# Patient Record
Sex: MALE | Race: WHITE | Employment: OTHER | URBAN - METROPOLITAN AREA
[De-identification: names, ages, dates, MRNs, and addresses within clinical notes are randomized per-mention and may not be internally consistent; named-entity substitution may affect disease eponyms.]

---

## 2022-03-30 ENCOUNTER — HOSPITAL ENCOUNTER (INPATIENT)
Age: 42
LOS: 2 days | Discharge: HOME | DRG: 638 | End: 2022-04-01
Attending: EMERGENCY MEDICINE | Admitting: INTERNAL MEDICINE

## 2022-03-30 ENCOUNTER — APPOINTMENT (OUTPATIENT)
Dept: GENERAL RADIOLOGY | Age: 42
DRG: 638 | End: 2022-03-30

## 2022-03-30 DIAGNOSIS — E13.10 DIABETIC KETOACIDOSIS WITHOUT COMA ASSOCIATED WITH OTHER SPECIFIED DIABETES MELLITUS (HCC): Primary | ICD-10-CM

## 2022-03-30 PROBLEM — E08.10 DIABETIC KETOACIDOSIS WITHOUT COMA ASSOCIATED WITH DIABETES MELLITUS DUE TO UNDERLYING CONDITION (HCC): Status: ACTIVE | Noted: 2022-03-30

## 2022-03-30 PROBLEM — E11.10 DIABETIC ACIDOSIS WITHOUT COMA (HCC): Status: ACTIVE | Noted: 2022-03-30

## 2022-03-30 PROBLEM — E11.10 DKA, TYPE 2, NOT AT GOAL (HCC): Status: ACTIVE | Noted: 2022-03-30

## 2022-03-30 LAB
A/G RATIO: 1.6 (ref 1.1–2.2)
ALBUMIN SERPL-MCNC: 4.6 G/DL (ref 3.4–5)
ALP BLD-CCNC: 197 U/L (ref 40–129)
ALT SERPL-CCNC: 16 U/L (ref 10–40)
AMORPHOUS: ABNORMAL /HPF
AMPHETAMINE SCREEN, URINE: NORMAL
ANION GAP SERPL CALCULATED.3IONS-SCNC: 13 MMOL/L (ref 3–16)
ANION GAP SERPL CALCULATED.3IONS-SCNC: 16 MMOL/L (ref 3–16)
ANION GAP SERPL CALCULATED.3IONS-SCNC: 29 MMOL/L (ref 3–16)
AST SERPL-CCNC: 10 U/L (ref 15–37)
BACTERIA: ABNORMAL /HPF
BANDED NEUTROPHILS RELATIVE PERCENT: 12 % (ref 0–7)
BARBITURATE SCREEN URINE: NORMAL
BASE EXCESS VENOUS: -22 (ref -3–3)
BASE EXCESS VENOUS: <-30 MMOL/L (ref -3–3)
BASE EXCESS VENOUS: <30 MMOL/L (ref -3–3)
BASOPHILS ABSOLUTE: 0 K/UL (ref 0–0.2)
BASOPHILS ABSOLUTE: 0 K/UL (ref 0–0.2)
BASOPHILS RELATIVE PERCENT: 0 %
BASOPHILS RELATIVE PERCENT: 0.2 %
BENZODIAZEPINE SCREEN, URINE: NORMAL
BILIRUB SERPL-MCNC: <0.2 MG/DL (ref 0–1)
BILIRUBIN URINE: ABNORMAL
BILIRUBIN URINE: NEGATIVE
BLOOD, URINE: ABNORMAL
BLOOD, URINE: ABNORMAL
BUN BLDV-MCNC: 12 MG/DL (ref 7–20)
BUN BLDV-MCNC: 14 MG/DL (ref 7–20)
BUN BLDV-MCNC: 16 MG/DL (ref 7–20)
CALCIUM SERPL-MCNC: 8 MG/DL (ref 8.3–10.6)
CALCIUM SERPL-MCNC: 8.8 MG/DL (ref 8.3–10.6)
CALCIUM SERPL-MCNC: 9.1 MG/DL (ref 8.3–10.6)
CANNABINOID SCREEN URINE: NORMAL
CELLULAR CASTS: ABNORMAL /LPF
CHLORIDE BLD-SCNC: 103 MMOL/L (ref 99–110)
CHLORIDE BLD-SCNC: 111 MMOL/L (ref 99–110)
CHLORIDE BLD-SCNC: 114 MMOL/L (ref 99–110)
CLARITY: CLEAR
CLARITY: CLEAR
CO2: 10 MMOL/L (ref 21–32)
CO2: 3 MMOL/L (ref 21–32)
CO2: 9 MMOL/L (ref 21–32)
COCAINE METABOLITE SCREEN URINE: NORMAL
COLOR: YELLOW
COLOR: YELLOW
CREAT SERPL-MCNC: 0.8 MG/DL (ref 0.9–1.3)
CREAT SERPL-MCNC: 0.9 MG/DL (ref 0.9–1.3)
CREAT SERPL-MCNC: 1 MG/DL (ref 0.9–1.3)
EKG ATRIAL RATE: 116 BPM
EKG ATRIAL RATE: 81 BPM
EKG DIAGNOSIS: NORMAL
EKG DIAGNOSIS: NORMAL
EKG P AXIS: 52 DEGREES
EKG P AXIS: 63 DEGREES
EKG P-R INTERVAL: 130 MS
EKG P-R INTERVAL: 134 MS
EKG Q-T INTERVAL: 306 MS
EKG Q-T INTERVAL: 410 MS
EKG QRS DURATION: 88 MS
EKG QRS DURATION: 94 MS
EKG QTC CALCULATION (BAZETT): 425 MS
EKG QTC CALCULATION (BAZETT): 476 MS
EKG R AXIS: 58 DEGREES
EKG R AXIS: 74 DEGREES
EKG T AXIS: -27 DEGREES
EKG T AXIS: 67 DEGREES
EKG VENTRICULAR RATE: 116 BPM
EKG VENTRICULAR RATE: 81 BPM
EOSINOPHILS ABSOLUTE: 0 K/UL (ref 0–0.6)
EOSINOPHILS ABSOLUTE: 0 K/UL (ref 0–0.6)
EOSINOPHILS RELATIVE PERCENT: 0 %
EOSINOPHILS RELATIVE PERCENT: 0 %
EPITHELIAL CELLS, UA: ABNORMAL /HPF (ref 0–5)
ETHANOL: NORMAL MG/DL (ref 0–0.08)
FINE CASTS, UA: ABNORMAL /LPF (ref 0–2)
GFR AFRICAN AMERICAN: >60
GFR NON-AFRICAN AMERICAN: >60
GLUCOSE BLD-MCNC: 169 MG/DL (ref 70–99)
GLUCOSE BLD-MCNC: 175 MG/DL (ref 70–99)
GLUCOSE BLD-MCNC: 178 MG/DL (ref 70–99)
GLUCOSE BLD-MCNC: 179 MG/DL (ref 70–99)
GLUCOSE BLD-MCNC: 179 MG/DL (ref 70–99)
GLUCOSE BLD-MCNC: 185 MG/DL (ref 70–99)
GLUCOSE BLD-MCNC: 185 MG/DL (ref 70–99)
GLUCOSE BLD-MCNC: 187 MG/DL (ref 70–99)
GLUCOSE BLD-MCNC: 195 MG/DL (ref 70–99)
GLUCOSE BLD-MCNC: 195 MG/DL (ref 70–99)
GLUCOSE BLD-MCNC: 203 MG/DL (ref 70–99)
GLUCOSE BLD-MCNC: 217 MG/DL (ref 70–99)
GLUCOSE BLD-MCNC: 246 MG/DL (ref 70–99)
GLUCOSE BLD-MCNC: 263 MG/DL (ref 70–99)
GLUCOSE BLD-MCNC: 331 MG/DL (ref 70–99)
GLUCOSE BLD-MCNC: 451 MG/DL (ref 70–99)
GLUCOSE BLD-MCNC: 481 MG/DL (ref 70–99)
GLUCOSE URINE: 250 MG/DL
GLUCOSE URINE: 500 MG/DL
HCO3 VENOUS: 2.6 MMOL/L (ref 23–29)
HCO3 VENOUS: 3.4 MMOL/L (ref 23–29)
HCO3 VENOUS: 7.6 MMOL/L (ref 23–29)
HCT VFR BLD CALC: 39.2 % (ref 40.5–52.5)
HCT VFR BLD CALC: 43.8 % (ref 40.5–52.5)
HEMOGLOBIN: 13.4 G/DL (ref 13.5–17.5)
HEMOGLOBIN: 14.2 G/DL (ref 13.5–17.5)
HYALINE CASTS: ABNORMAL /LPF (ref 0–2)
KETONES, URINE: 40 MG/DL
KETONES, URINE: >=160 MG/DL
LACTATE: 1.52 MMOL/L (ref 0.4–2)
LACTIC ACID: 1.2 MMOL/L (ref 0.4–2)
LEUKOCYTE ESTERASE, URINE: NEGATIVE
LEUKOCYTE ESTERASE, URINE: NEGATIVE
LIPASE: 98 U/L (ref 13–60)
LYMPHOCYTES ABSOLUTE: 0.3 K/UL (ref 1–5.1)
LYMPHOCYTES ABSOLUTE: 0.7 K/UL (ref 1–5.1)
LYMPHOCYTES RELATIVE PERCENT: 2 %
LYMPHOCYTES RELATIVE PERCENT: 6.6 %
Lab: NORMAL
MAGNESIUM: 1.6 MG/DL (ref 1.8–2.4)
MAGNESIUM: 1.8 MG/DL (ref 1.8–2.4)
MAGNESIUM: 2.2 MG/DL (ref 1.8–2.4)
MCH RBC QN AUTO: 32.5 PG (ref 26–34)
MCH RBC QN AUTO: 32.7 PG (ref 26–34)
MCHC RBC AUTO-ENTMCNC: 32.5 G/DL (ref 31–36)
MCHC RBC AUTO-ENTMCNC: 34.1 G/DL (ref 31–36)
MCV RBC AUTO: 100 FL (ref 80–100)
MCV RBC AUTO: 95.8 FL (ref 80–100)
METAMYELOCYTES RELATIVE PERCENT: 2 %
METHADONE SCREEN, URINE: NORMAL
MICROSCOPIC EXAMINATION: YES
MICROSCOPIC EXAMINATION: YES
MONOCYTES ABSOLUTE: 1 K/UL (ref 0–1.3)
MONOCYTES ABSOLUTE: 1.2 K/UL (ref 0–1.3)
MONOCYTES RELATIVE PERCENT: 10.9 %
MONOCYTES RELATIVE PERCENT: 6 %
NEUTROPHILS ABSOLUTE: 15.9 K/UL (ref 1.7–7.7)
NEUTROPHILS ABSOLUTE: 9.2 K/UL (ref 1.7–7.7)
NEUTROPHILS RELATIVE PERCENT: 78 %
NEUTROPHILS RELATIVE PERCENT: 82.3 %
NITRITE, URINE: NEGATIVE
NITRITE, URINE: NEGATIVE
O2 SAT, VEN: 17 %
O2 SAT, VEN: 66 %
O2 SAT, VEN: 70 %
O2 THERAPY: ABNORMAL
O2 THERAPY: ABNORMAL
OPIATE SCREEN URINE: NORMAL
OXYCODONE URINE: NORMAL
PCO2, VEN: 15.2 MMHG (ref 40–50)
PCO2, VEN: 18.5 MMHG (ref 40–50)
PCO2, VEN: 24.7 MM HG (ref 40–50)
PDW BLD-RTO: 15.2 % (ref 12.4–15.4)
PDW BLD-RTO: 15.8 % (ref 12.4–15.4)
PERFORMED ON: ABNORMAL
PH UA: 5.5 (ref 5–8)
PH UA: 6
PH UA: 6 (ref 5–8)
PH VENOUS: 6.84 (ref 7.35–7.45)
PH VENOUS: 6.87 (ref 7.35–7.45)
PH VENOUS: 7.1 (ref 7.35–7.45)
PH VENOUS: 7.18 (ref 7.35–7.45)
PH VENOUS: 7.25 (ref 7.35–7.45)
PHENCYCLIDINE SCREEN URINE: NORMAL
PHOSPHORUS: 1.2 MG/DL (ref 2.5–4.9)
PHOSPHORUS: 1.6 MG/DL (ref 2.5–4.9)
PHOSPHORUS: 3.2 MG/DL (ref 2.5–4.9)
PLATELET # BLD: 204 K/UL (ref 135–450)
PLATELET # BLD: 323 K/UL (ref 135–450)
PLATELET SLIDE REVIEW: ADEQUATE
PMV BLD AUTO: 6.8 FL (ref 5–10.5)
PMV BLD AUTO: 7.5 FL (ref 5–10.5)
PO2, VEN: 19 MM HG
PO2, VEN: 57.7 MMHG (ref 25–40)
PO2, VEN: 63.7 MMHG (ref 25–40)
POC SAMPLE TYPE: ABNORMAL
POTASSIUM SERPL-SCNC: 2.7 MMOL/L (ref 3.5–5.1)
POTASSIUM SERPL-SCNC: 2.8 MMOL/L (ref 3.5–5.1)
POTASSIUM SERPL-SCNC: 3.5 MMOL/L (ref 3.5–5.1)
PROCALCITONIN: 0.56 NG/ML (ref 0–0.15)
PROPOXYPHENE SCREEN: NORMAL
PROTEIN UA: 100 MG/DL
PROTEIN UA: 30 MG/DL
RBC # BLD: 4.09 M/UL (ref 4.2–5.9)
RBC # BLD: 4.38 M/UL (ref 4.2–5.9)
RBC UA: ABNORMAL /HPF (ref 0–4)
RBC UA: ABNORMAL /HPF (ref 0–4)
SLIDE REVIEW: ABNORMAL
SODIUM BLD-SCNC: 135 MMOL/L (ref 136–145)
SODIUM BLD-SCNC: 136 MMOL/L (ref 136–145)
SODIUM BLD-SCNC: 137 MMOL/L (ref 136–145)
SPECIFIC GRAVITY UA: 1.02 (ref 1–1.03)
SPECIFIC GRAVITY UA: 1.02 (ref 1–1.03)
TCO2 CALC VENOUS: 8 MMOL/L
TCO2 CALC VENOUS: <5 MMOL/L
TCO2 CALC VENOUS: <5 MMOL/L
TEAR DROP CELLS: ABNORMAL
TOTAL CK: 115 U/L (ref 39–308)
TOTAL PROTEIN: 7.5 G/DL (ref 6.4–8.2)
TROPONIN: <0.01 NG/ML
TROPONIN: <0.01 NG/ML
URINE REFLEX TO CULTURE: ABNORMAL
URINE TYPE: ABNORMAL
URINE TYPE: ABNORMAL
UROBILINOGEN, URINE: 0.2 E.U./DL
UROBILINOGEN, URINE: 0.2 E.U./DL
WBC # BLD: 11.2 K/UL (ref 4–11)
WBC # BLD: 17.3 K/UL (ref 4–11)
WBC UA: ABNORMAL /HPF (ref 0–5)
WBC UA: ABNORMAL /HPF (ref 0–5)

## 2022-03-30 PROCEDURE — 96376 TX/PRO/DX INJ SAME DRUG ADON: CPT

## 2022-03-30 PROCEDURE — 6360000002 HC RX W HCPCS: Performed by: STUDENT IN AN ORGANIZED HEALTH CARE EDUCATION/TRAINING PROGRAM

## 2022-03-30 PROCEDURE — 96365 THER/PROPH/DIAG IV INF INIT: CPT

## 2022-03-30 PROCEDURE — 82803 BLOOD GASES ANY COMBINATION: CPT

## 2022-03-30 PROCEDURE — 84484 ASSAY OF TROPONIN QUANT: CPT

## 2022-03-30 PROCEDURE — 6370000000 HC RX 637 (ALT 250 FOR IP): Performed by: STUDENT IN AN ORGANIZED HEALTH CARE EDUCATION/TRAINING PROGRAM

## 2022-03-30 PROCEDURE — 2580000003 HC RX 258: Performed by: STUDENT IN AN ORGANIZED HEALTH CARE EDUCATION/TRAINING PROGRAM

## 2022-03-30 PROCEDURE — 85025 COMPLETE CBC W/AUTO DIFF WBC: CPT

## 2022-03-30 PROCEDURE — 82077 ASSAY SPEC XCP UR&BREATH IA: CPT

## 2022-03-30 PROCEDURE — 81001 URINALYSIS AUTO W/SCOPE: CPT

## 2022-03-30 PROCEDURE — 99223 1ST HOSP IP/OBS HIGH 75: CPT | Performed by: INTERNAL MEDICINE

## 2022-03-30 PROCEDURE — 93010 ELECTROCARDIOGRAM REPORT: CPT | Performed by: INTERNAL MEDICINE

## 2022-03-30 PROCEDURE — 2580000003 HC RX 258: Performed by: EMERGENCY MEDICINE

## 2022-03-30 PROCEDURE — 2500000003 HC RX 250 WO HCPCS: Performed by: STUDENT IN AN ORGANIZED HEALTH CARE EDUCATION/TRAINING PROGRAM

## 2022-03-30 PROCEDURE — 74018 RADEX ABDOMEN 1 VIEW: CPT

## 2022-03-30 PROCEDURE — 83036 HEMOGLOBIN GLYCOSYLATED A1C: CPT

## 2022-03-30 PROCEDURE — 82947 ASSAY GLUCOSE BLOOD QUANT: CPT

## 2022-03-30 PROCEDURE — 83735 ASSAY OF MAGNESIUM: CPT

## 2022-03-30 PROCEDURE — 84100 ASSAY OF PHOSPHORUS: CPT

## 2022-03-30 PROCEDURE — 83605 ASSAY OF LACTIC ACID: CPT

## 2022-03-30 PROCEDURE — 80307 DRUG TEST PRSMV CHEM ANLYZR: CPT

## 2022-03-30 PROCEDURE — 93005 ELECTROCARDIOGRAM TRACING: CPT | Performed by: EMERGENCY MEDICINE

## 2022-03-30 PROCEDURE — 83690 ASSAY OF LIPASE: CPT

## 2022-03-30 PROCEDURE — 84145 PROCALCITONIN (PCT): CPT

## 2022-03-30 PROCEDURE — 80053 COMPREHEN METABOLIC PANEL: CPT

## 2022-03-30 PROCEDURE — 6370000000 HC RX 637 (ALT 250 FOR IP): Performed by: EMERGENCY MEDICINE

## 2022-03-30 PROCEDURE — 87040 BLOOD CULTURE FOR BACTERIA: CPT

## 2022-03-30 PROCEDURE — 82550 ASSAY OF CK (CPK): CPT

## 2022-03-30 PROCEDURE — 82800 BLOOD PH: CPT

## 2022-03-30 PROCEDURE — 99284 EMERGENCY DEPT VISIT MOD MDM: CPT

## 2022-03-30 PROCEDURE — 6360000002 HC RX W HCPCS: Performed by: EMERGENCY MEDICINE

## 2022-03-30 PROCEDURE — 2000000000 HC ICU R&B

## 2022-03-30 PROCEDURE — 36415 COLL VENOUS BLD VENIPUNCTURE: CPT

## 2022-03-30 PROCEDURE — 96375 TX/PRO/DX INJ NEW DRUG ADDON: CPT

## 2022-03-30 PROCEDURE — 71045 X-RAY EXAM CHEST 1 VIEW: CPT

## 2022-03-30 PROCEDURE — 93005 ELECTROCARDIOGRAM TRACING: CPT | Performed by: STUDENT IN AN ORGANIZED HEALTH CARE EDUCATION/TRAINING PROGRAM

## 2022-03-30 RX ORDER — 0.9 % SODIUM CHLORIDE 0.9 %
1000 INTRAVENOUS SOLUTION INTRAVENOUS ONCE
Status: COMPLETED | OUTPATIENT
Start: 2022-03-30 | End: 2022-03-30

## 2022-03-30 RX ORDER — DEXTROSE MONOHYDRATE 25 G/50ML
12.5 INJECTION, SOLUTION INTRAVENOUS PRN
Status: DISCONTINUED | OUTPATIENT
Start: 2022-03-30 | End: 2022-03-30

## 2022-03-30 RX ORDER — POTASSIUM CHLORIDE 7.45 MG/ML
10 INJECTION INTRAVENOUS PRN
Status: DISCONTINUED | OUTPATIENT
Start: 2022-03-30 | End: 2022-03-31

## 2022-03-30 RX ORDER — DEXTROSE MONOHYDRATE 50 MG/ML
100 INJECTION, SOLUTION INTRAVENOUS PRN
Status: DISCONTINUED | OUTPATIENT
Start: 2022-03-30 | End: 2022-03-30

## 2022-03-30 RX ORDER — SODIUM CHLORIDE 450 MG/100ML
INJECTION, SOLUTION INTRAVENOUS CONTINUOUS
Status: DISCONTINUED | OUTPATIENT
Start: 2022-03-30 | End: 2022-03-31

## 2022-03-30 RX ORDER — POTASSIUM CHLORIDE 20 MEQ/1
40 TABLET, EXTENDED RELEASE ORAL ONCE
Status: COMPLETED | OUTPATIENT
Start: 2022-03-30 | End: 2022-03-30

## 2022-03-30 RX ORDER — NICOTINE POLACRILEX 4 MG
15 LOZENGE BUCCAL PRN
Status: DISCONTINUED | OUTPATIENT
Start: 2022-03-30 | End: 2022-03-30

## 2022-03-30 RX ORDER — POLYETHYLENE GLYCOL 3350 17 G/17G
17 POWDER, FOR SOLUTION ORAL DAILY PRN
Status: DISCONTINUED | OUTPATIENT
Start: 2022-03-30 | End: 2022-04-01 | Stop reason: HOSPADM

## 2022-03-30 RX ORDER — DEXTROSE AND SODIUM CHLORIDE 5; .45 G/100ML; G/100ML
INJECTION, SOLUTION INTRAVENOUS CONTINUOUS PRN
Status: DISCONTINUED | OUTPATIENT
Start: 2022-03-30 | End: 2022-03-31

## 2022-03-30 RX ORDER — MAGNESIUM SULFATE 1 G/100ML
1000 INJECTION INTRAVENOUS PRN
Status: DISCONTINUED | OUTPATIENT
Start: 2022-03-30 | End: 2022-03-31

## 2022-03-30 RX ORDER — SODIUM CHLORIDE 0.9 % (FLUSH) 0.9 %
5-40 SYRINGE (ML) INJECTION EVERY 12 HOURS SCHEDULED
Status: DISCONTINUED | OUTPATIENT
Start: 2022-03-30 | End: 2022-04-01 | Stop reason: HOSPADM

## 2022-03-30 RX ORDER — ONDANSETRON 2 MG/ML
4 INJECTION INTRAMUSCULAR; INTRAVENOUS ONCE
Status: COMPLETED | OUTPATIENT
Start: 2022-03-30 | End: 2022-03-30

## 2022-03-30 RX ORDER — SODIUM CHLORIDE 9 MG/ML
INJECTION, SOLUTION INTRAVENOUS PRN
Status: DISCONTINUED | OUTPATIENT
Start: 2022-03-30 | End: 2022-04-01 | Stop reason: HOSPADM

## 2022-03-30 RX ORDER — SODIUM CHLORIDE 9 MG/ML
1000 INJECTION, SOLUTION INTRAVENOUS CONTINUOUS
Status: DISCONTINUED | OUTPATIENT
Start: 2022-03-30 | End: 2022-03-31

## 2022-03-30 RX ORDER — POTASSIUM CHLORIDE 7.45 MG/ML
10 INJECTION INTRAVENOUS
Status: ACTIVE | OUTPATIENT
Start: 2022-03-30 | End: 2022-03-31

## 2022-03-30 RX ORDER — SODIUM CHLORIDE 0.9 % (FLUSH) 0.9 %
5-40 SYRINGE (ML) INJECTION PRN
Status: DISCONTINUED | OUTPATIENT
Start: 2022-03-30 | End: 2022-04-01 | Stop reason: HOSPADM

## 2022-03-30 RX ORDER — POTASSIUM CHLORIDE 750 MG/1
40 TABLET, EXTENDED RELEASE ORAL ONCE
Status: COMPLETED | OUTPATIENT
Start: 2022-03-30 | End: 2022-03-30

## 2022-03-30 RX ADMIN — SODIUM PHOSPHATE, MONOBASIC, MONOHYDRATE AND SODIUM PHOSPHATE, DIBASIC, ANHYDROUS 20 MMOL: 276; 142 INJECTION, SOLUTION INTRAVENOUS at 17:40

## 2022-03-30 RX ADMIN — POTASSIUM CHLORIDE 10 MEQ: 10 INJECTION, SOLUTION INTRAVENOUS at 22:39

## 2022-03-30 RX ADMIN — POTASSIUM CHLORIDE 10 MEQ: 10 INJECTION, SOLUTION INTRAVENOUS at 17:31

## 2022-03-30 RX ADMIN — DEXTROSE AND SODIUM CHLORIDE: 5; 450 INJECTION, SOLUTION INTRAVENOUS at 21:35

## 2022-03-30 RX ADMIN — SODIUM CHLORIDE 1000 ML: 9 INJECTION, SOLUTION INTRAVENOUS at 10:28

## 2022-03-30 RX ADMIN — POTASSIUM CHLORIDE 40 MEQ: 20 TABLET, EXTENDED RELEASE ORAL at 21:15

## 2022-03-30 RX ADMIN — POTASSIUM CHLORIDE 10 MEQ: 10 INJECTION, SOLUTION INTRAVENOUS at 23:45

## 2022-03-30 RX ADMIN — POTASSIUM CHLORIDE 10 MEQ: 10 INJECTION, SOLUTION INTRAVENOUS at 16:25

## 2022-03-30 RX ADMIN — POTASSIUM CHLORIDE 10 MEQ: 10 INJECTION, SOLUTION INTRAVENOUS at 21:36

## 2022-03-30 RX ADMIN — SODIUM CHLORIDE 0.1 UNITS/KG/HR: 9 INJECTION, SOLUTION INTRAVENOUS at 11:23

## 2022-03-30 RX ADMIN — SODIUM CHLORIDE, PRESERVATIVE FREE 10 ML: 5 INJECTION INTRAVENOUS at 20:37

## 2022-03-30 RX ADMIN — POTASSIUM CHLORIDE 10 MEQ: 10 INJECTION, SOLUTION INTRAVENOUS at 19:42

## 2022-03-30 RX ADMIN — ONDANSETRON 4 MG: 2 INJECTION INTRAMUSCULAR; INTRAVENOUS at 09:10

## 2022-03-30 RX ADMIN — INSULIN HUMAN 10 UNITS: 100 INJECTION, SOLUTION PARENTERAL at 09:55

## 2022-03-30 RX ADMIN — SODIUM CHLORIDE 1000 ML: 9 INJECTION, SOLUTION INTRAVENOUS at 09:10

## 2022-03-30 RX ADMIN — SODIUM CHLORIDE 3.45 UNITS/HR: 9 INJECTION, SOLUTION INTRAVENOUS at 22:12

## 2022-03-30 RX ADMIN — DEXTROSE AND SODIUM CHLORIDE: 5; 450 INJECTION, SOLUTION INTRAVENOUS at 13:32

## 2022-03-30 RX ADMIN — SODIUM CHLORIDE 1000 ML: 9 INJECTION, SOLUTION INTRAVENOUS at 09:43

## 2022-03-30 RX ADMIN — POTASSIUM CHLORIDE 40 MEQ: 750 TABLET, EXTENDED RELEASE ORAL at 09:58

## 2022-03-30 RX ADMIN — ENOXAPARIN SODIUM 40 MG: 100 INJECTION SUBCUTANEOUS at 14:34

## 2022-03-30 RX ADMIN — POTASSIUM CHLORIDE 10 MEQ: 10 INJECTION, SOLUTION INTRAVENOUS at 18:40

## 2022-03-30 RX ADMIN — THIAMINE HYDROCHLORIDE 100 MG: 100 INJECTION, SOLUTION INTRAMUSCULAR; INTRAVENOUS at 17:43

## 2022-03-30 ASSESSMENT — PAIN DESCRIPTION - LOCATION: LOCATION: CHEST

## 2022-03-30 ASSESSMENT — ENCOUNTER SYMPTOMS
SHORTNESS OF BREATH: 1
SINUS PAIN: 0
DIARRHEA: 0
VOMITING: 1
BACK PAIN: 0
WHEEZING: 0
ABDOMINAL DISTENTION: 0
ABDOMINAL PAIN: 0
CONSTIPATION: 1
SORE THROAT: 0
NAUSEA: 1
COUGH: 1

## 2022-03-30 ASSESSMENT — PAIN SCALES - GENERAL
PAINLEVEL_OUTOF10: 0
PAINLEVEL_OUTOF10: 0

## 2022-03-30 NOTE — PROGRESS NOTES
4 Eyes Admission Assessment     I agree as the admission nurse that 2 RN's have performed a thorough Head to Toe Skin Assessment on the patient. ALL assessment sites listed below have been assessed on admission. Areas assessed by both nurses:  [x]   Head, Face, and Ears   [x]   Shoulders, Back, and Chest  [x]   Arms, Elbows, and Hands   [x]   Coccyx, Sacrum, and Ischium  [x]   Legs, Feet, and Heels        Does the Patient have Skin Breakdown? No . Patient does have scattered rash on bilateral lower extremities. Areas are not open. Patient states that he believes it is related to his high blood sugar.         Valentin Prevention initiated:  Yes   Wound Care Orders initiated:  NA      [facility] nurse consulted for Pressure Injury (Stage 3,4, Unstageable, DTI, NWPT, and Complex wounds) or Valentin score 18 or lower:  NA      Nurse 1 eSignature: Electronically signed by Ambar Valiente RN on 3/30/22 at 6:05 PM EDT    **SHARE this note so that the co-signing nurse is able to place an eSignature**    Nurse 2 eSignature: Electronically signed by Elizabeth Gusman RN on 3/30/22 at 8:49 PM EDT

## 2022-03-30 NOTE — ED PROVIDER NOTES
CHIEF COMPLAINT  Shortness of Breath      HISTORY OF PRESENT ILLNESS  Antionette Ormond is a 46 y.o. male with a remote history of alcoholism and pancreatitis who presents to the ED complaining of shortness of breath. Patient reports that he recently moved from [place] last month. Over the last couple of days he has noted increasing shortness of breath. Patient denies fevers, chills, or sweats. No cough, or congestion. No chest pain reported. Patient further denies any lower extremity swelling or edema. He arrives into the emergency department with respirations of 30/min and pulse of 140. Patient denies known history of diabetes but admits that he has been urinating frequently. .   No other complaints, modifying factors or associated symptoms. I have reviewed the following from the nursing documentation. History reviewed. No pertinent past medical history. History reviewed. No pertinent surgical history. History reviewed. No pertinent family history. Social History     Socioeconomic History    Marital status: Single     Spouse name: Not on file    Number of children: Not on file    Years of education: Not on file    Highest education level: Not on file   Occupational History    Not on file   Tobacco Use    Smoking status: Never Smoker    Smokeless tobacco: Never Used   Substance and Sexual Activity    Alcohol use: Not Currently    Drug use: Not Currently    Sexual activity: Not on file   Other Topics Concern    Not on file   Social History Narrative    Not on file     Social Determinants of Health     Financial Resource Strain:     Difficulty of Paying Living Expenses: Not on file   Food Insecurity:     Worried About Running Out of Food in the Last Year: Not on file    Maxi of Food in the Last Year: Not on file   Transportation Needs:     Lack of Transportation (Medical): Not on file    Lack of Transportation (Non-Medical):  Not on file   Physical Activity:     Days of Exercise per Week: Not on file    Minutes of Exercise per Session: Not on file   Stress:     Feeling of Stress : Not on file   Social Connections:     Frequency of Communication with Friends and Family: Not on file    Frequency of Social Gatherings with Friends and Family: Not on file    Attends Jehovah's witness Services: Not on file    Active Member of Clubs or Organizations: Not on file    Attends Club or Organization Meetings: Not on file    Marital Status: Not on file   Intimate Partner Violence:     Fear of Current or Ex-Partner: Not on file    Emotionally Abused: Not on file    Physically Abused: Not on file    Sexually Abused: Not on file   Housing Stability:     Unable to Pay for Housing in the Last Year: Not on file    Number of [place] in the Last Year: Not on file    Unstable Housing in the Last Year: Not on file     Current Facility-Administered Medications   Medication Dose Route Frequency Provider Last Rate Last Admin    0.9 % sodium chloride bolus  1,000 mL IntraVENous Once Jasmyn Palm, DO 1,000 mL/hr at 03/30/22 0910 1,000 mL at 03/30/22 0910    0.9 % sodium chloride bolus  1,000 mL IntraVENous Once Jasmyn Palm, DO        insulin regular (HUMULIN R;NOVOLIN R) injection 10 Units  10 Units IntraVENous Once Jasmyn Palm, DO         No current outpatient medications on file. No Known Allergies    REVIEW OF SYSTEMS  10 systems reviewed, pertinent positives per HPI otherwise noted to be negative. PHYSICAL EXAM  BP (!) 171/90   Pulse 140   Temp 97.6 °F (36.4 °C)   Resp 30   Ht 6' (1.829 m)   Wt 134 lb 12.8 oz (61.1 kg)   SpO2 100%   BMI 18.28 kg/m²   GENERAL APPEARANCE: Awake and alert. Cooperative. Cachectic. Moderate distress. HEAD: Normocephalic. Atraumatic. EYES: PERRL. EOM's grossly intact. ENT: Mucous membranes are dry  NECK: Supple, trachea midline. HEART: Tachycardic. Normal S1, S2. No murmurs, rubs or gallops. LUNGS: Tachypnea.   Clear lung sounds throughout without wheezing, rales, or rhonchi. ABDOMEN: Soft. Non-distended. Non-tender. No guarding or rebound. Normal Bowel sounds. EXTREMITIES: No peripheral edema. MAEE. No acute deformities. SKIN: Warm and dry. No acute rashes. NEUROLOGICAL: Alert and oriented X 3. CN II-XII intact. No gross facial drooping. Strength 5/5, sensation intact. No pronator drift. Normal coordination. Gait normal.   PSYCHIATRIC: Normal mood and affect. LABS  I have reviewed all labs for this visit.    Results for orders placed or performed during the hospital encounter of 03/30/22   CBC with Auto Differential   Result Value Ref Range    WBC 17.3 (H) 4.0 - 11.0 K/uL    RBC 4.38 4.20 - 5.90 M/uL    Hemoglobin 14.2 13.5 - 17.5 g/dL    Hematocrit 43.8 40.5 - 52.5 %    .0 80.0 - 100.0 fL    MCH 32.5 26.0 - 34.0 pg    MCHC 32.5 31.0 - 36.0 g/dL    RDW 15.8 (H) 12.4 - 15.4 %    Platelets 213 197 - 463 K/uL    MPV 7.5 5.0 - 10.5 fL    PLATELET SLIDE REVIEW Adequate     SLIDE REVIEW see below     Neutrophils % 78.0 %    Lymphocytes % 2.0 %    Monocytes % 6.0 %    Eosinophils % 0.0 %    Basophils % 0.0 %    Neutrophils Absolute 15.9 (H) 1.7 - 7.7 K/uL    Lymphocytes Absolute 0.3 (L) 1.0 - 5.1 K/uL    Monocytes Absolute 1.0 0.0 - 1.3 K/uL    Eosinophils Absolute 0.0 0.0 - 0.6 K/uL    Basophils Absolute 0.0 0.0 - 0.2 K/uL    Bands Relative 12 (H) 0 - 7 %    Metamyelocytes Relative 2 (A) %    Tear Drop Cells 1+ (A)    Lipase   Result Value Ref Range    Lipase 98.0 (H) 13.0 - 60.0 U/L   Comprehensive Metabolic Panel   Result Value Ref Range    Sodium 135 (L) 136 - 145 mmol/L    Potassium 3.5 3.5 - 5.1 mmol/L    Chloride 103 99 - 110 mmol/L    CO2 3 (LL) 21 - 32 mmol/L    Anion Gap 29 (H) 3 - 16    Glucose 481 (H) 70 - 99 mg/dL    BUN 16 7 - 20 mg/dL    CREATININE 1.0 0.9 - 1.3 mg/dL    GFR Non-African American >60 >60    GFR African American >60 >60    Calcium 9.1 8.3 - 10.6 mg/dL    Total Protein 7.5 6.4 - 8.2 g/dL    Albumin 4.6 3.4 - 5.0 g/dL    Albumin/Globulin Ratio 1.6 1.1 - 2.2    Total Bilirubin <0.2 0.0 - 1.0 mg/dL    Alkaline Phosphatase 197 (H) 40 - 129 U/L    ALT 16 10 - 40 U/L    AST 10 (L) 15 - 37 U/L   Urinalysis   Result Value Ref Range    Color, UA Yellow Straw/Yellow    Clarity, UA Clear Clear    Glucose, Ur 500 (A) Negative mg/dL    Bilirubin Urine Negative Negative    Ketones, Urine >=160 (A) Negative mg/dL    Specific Gravity, UA 1.025 1.005 - 1.030    Blood, Urine SMALL (A) Negative    pH, UA 5.5 5.0 - 8.0    Protein,  (A) Negative mg/dL    Urobilinogen, Urine 0.2 <2.0 E.U./dL    Nitrite, Urine Negative Negative    Leukocyte Esterase, Urine Negative Negative    Microscopic Examination YES     Urine Type NotGiven    Blood Gas, Venous   Result Value Ref Range    pH, Mert 6.870 (LL) 7.350 - 7.450    pCO2, Mert 18.5 (L) 40.0 - 50.0 mmHg    pO2, Mert 57.7 (H) 25.0 - 40.0 mmHg    HCO3, Venous 3.4 (L) 23.0 - 29.0 mmol/L    Base Excess, Mert <-30.0 (L) -3.0 - 3.0 mmol/L    O2 Sat, Mert 66 Not Established %    TC02 (Calc), Mert <5 Not Established mmol/L    O2 Therapy Unknown    Troponin   Result Value Ref Range    Troponin <0.01 <0.01 ng/mL   Magnesium   Result Value Ref Range    Magnesium 2.20 1.80 - 2.40 mg/dL   Blood Gas, Venous   Result Value Ref Range    pH, Mert 6.837 (LL) 7.350 - 7.450    pCO2, Mert 15.2 (L) 40.0 - 50.0 mmHg    pO2, Mert 63.7 (H) 25.0 - 40.0 mmHg    HCO3, Venous 2.6 (L) 23.0 - 29.0 mmol/L    Base Excess, Mert <30.0 (H) -3.0 - 3.0 mmol/L    O2 Sat, Mert 70 Not Established %    TC02 (Calc), Mert <5 Not Established mmol/L    O2 Therapy Unknown    Microscopic Urinalysis   Result Value Ref Range    WBC, UA 3-5 0 - 5 /HPF    RBC, UA 3-4 0 - 4 /HPF    Epithelial Cells, UA 2-5 0 - 5 /HPF    Bacteria, UA 1+ (A) None Seen /HPF    Amorphous, UA 2+ /HPF   POCT Glucose   Result Value Ref Range    POC Glucose 451 (H) 70 - 99 mg/dl    Performed on ACCU-CHEK    POCT Glucose   Result Value Ref Range    POC Glucose 331 (H) 70 - 99 mg/dl    Performed on ACCU-CHEK    EKG 12 Lead   Result Value Ref Range    Ventricular Rate 116 BPM    Atrial Rate 116 BPM    P-R Interval 134 ms    QRS Duration 88 ms    Q-T Interval 306 ms    QTc Calculation (Bazett) 425 ms    P Axis 63 degrees    R Axis 58 degrees    T Axis -27 degrees    Diagnosis        Poor data quality, interpretation may be adversely affectedSinus tachycardiaPossible Left atrial enlargementT wave abnormality, consider inferior ischemiaAbnormal ECG       EKG  The Ekg interpreted by myself  sinus tachycardia, yoko=[id number]   Axis is   Normal  QTc is  normal  Intervals and Durations are unremarkable. Nonspecific inferior T wave changes noted. No previous EKG. Cardiac Monitoring: Negative. Normal rate. RADIOLOGY  X-RAYS:  I have reviewed radiologic plain film image(s). ALL OTHER NON-PLAIN FILM IMAGES SUCH AS CT, ULTRASOUND AND MRI HAVE BEEN READ BY THE RADIOLOGIST. No orders to display              Rechecks: Physical assessment performed. 915: Pulse 120.    1021: Pulse 112.    1124: Pulse 98. Point-of-care glucose 331. Critical Care: Critical care of 40 minutes was completed on patient in addition to and excluding the procedures noted. ED COURSE/MDM  Patient seen and evaluated. Old records reviewed. Labs and imaging reviewed and results discussed with patient. Patient was given normal saline, insulin bolus and infusion, and potassium in the ED with good symptomatic relief. Patient was reassessed as noted above . Patient presents with tachycardia and tachypnea. Glucose approximately 480 with pH of 6.8. Patient tolerated meds well be admitted to [facility] ICU for further evaluation and treatment. . Plan of care discussed with patient and family. Patient and family in agreement with plan. Patient was given scripts for the following medications. I counseled patient how to take these medications.    New Prescriptions    No medications on file CLINICAL IMPRESSION  1. Diabetic ketoacidosis without coma associated with other specified diabetes mellitus ([facility] [address]        Blood pressure (!) 171/90, pulse 140, temperature 97.6 °F (36.4 °C), resp. rate 30, height 6' (1.829 m), weight 134 lb 12.8 oz (61.1 kg), SpO2 100 %. Marco A Maloney was admitted in critical condition.         Mahesh Kaur DO  03/30/22 1134

## 2022-03-30 NOTE — H&P
ICU HISTORY AND [facility] Day: 1  ICU Day: 1                                                         Code:Full Code  Admit Date: 3/30/2022  PCP: No primary care provider on file. CC: SOB    HISTORY OF PRESENT ILLNESS:   Nilesh Knapp is a 46year old male with history of alcoholism and pancreatitis who presents to the ED complaining of SOB. He recently moved here from [place] last month. In the past few days, he has had progressive SOB. Patient has never had this before. Patient states that he was obese in the past and had a significant drinking history. He had lost a lot of weight and was self dosing with insulin after a doctor had given him instructions. However he got control of his DM with diet and has been off insulin. He states he has been     Patient presented to the [facility] ED tachycardic to 140 and tachypneic to 30/min. Endorses SOB and polyuria. Labwork revealed glucose of 480 and a VBG pH of 6.8. Na at 135, 142 corrected for hyperglycemia, AG at 29, WBC at 17.3, ketones and ketones >=160 in the urine. Small blood in urine. CXR with no acute cardiopulmonary abnormalities. 1+ bacteria. Patient received 2L NS bolus and potassium repletion in the ED and started on insulin gtt. Patient transferred to [facility] ICU for further evaluation and management. PAST HISTORY:   History reviewed. No pertinent past medical history. History reviewed. No pertinent surgical history. Social History:   The patient lives at home    Alcohol: patient states he has only had one drink this week but has a alcoholic history  Illicit drugs: quit after 25 year history of smoking pot  Tobacco:  none    Family History:  History reviewed. No pertinent family history. MEDICATIONS:     No current facility-administered medications on file prior to encounter. No current outpatient medications on file prior to encounter.          Scheduled Meds:   enoxaparin  40 mg SubCUTAneous Daily      Continuous Infusions:   sodium chloride 1,000 mL (03/30/22 1028)    insulin      sodium chloride      dextrose 5 % and 0.45 % NaCl       PRN Meds:potassium chloride, polyethylene glycol, dextrose 5 % and 0.45 % NaCl, magnesium sulfate, sodium phosphate IVPB **OR** sodium phosphate IVPB **OR** sodium phosphate IVPB, dextrose bolus (hypoglycemia) **OR** dextrose bolus (hypoglycemia)    Allergies: No Known Allergies    REVIEW OF SYSTEMS:       History obtained from chart review and the patient    Review of Systems   Constitutional: Positive for chills. Negative for fever. HENT: Negative for sinus pain, sneezing and sore throat. Eyes: Negative for visual disturbance. Respiratory: Positive for cough and shortness of breath. Negative for wheezing. Cardiovascular: Negative for chest pain and palpitations. Gastrointestinal: Positive for constipation, nausea and vomiting. Negative for abdominal distention, abdominal pain and diarrhea. Endocrine: Positive for polyuria. Genitourinary: Negative for dysuria and hematuria. Musculoskeletal: Negative for arthralgias, back pain and myalgias. Neurological: Negative for dizziness, weakness, light-headedness, numbness and headaches. Hematological: Negative. PHYSICAL EXAM:       Vitals: /86   Pulse 93   Temp 97.7 °F (36.5 °C) (Oral)   Resp 14   Ht 6' (1.829 m)   Wt 134 lb 11.2 oz (61.1 kg)   SpO2 100%   BMI 18.27 kg/m²     I/O:      Intake/Output Summary (Last 24 hours) at 3/30/2022 1259  Last data filed at 3/30/2022 1130  Gross per 24 hour   Intake --   Output 1000 ml   Net -1000 ml     I/O this shift:  In: -   Out: 1000 [Urine:1000]  No intake/output data recorded. Physical Examination:     Physical Exam  Constitutional:       General: He is not in acute distress. Appearance: He is normal weight. He is ill-appearing. He is not toxic-appearing. HENT:      Head: Normocephalic and atraumatic.       Right Ear: External ear normal.      Left Ear: External ear normal.      Nose: Nose normal.      Mouth/Throat:      Mouth: Mucous membranes are dry. Pharynx: Oropharynx is clear. Eyes:      General: No scleral icterus. Right eye: No discharge. Left eye: No discharge. Extraocular Movements: Extraocular movements intact. Pupils: Pupils are equal, round, and reactive to light. Cardiovascular:      Rate and Rhythm: Normal rate and regular rhythm. Heart sounds: Normal heart sounds. No murmur heard. No friction rub. No gallop. Pulmonary:      Effort: Pulmonary effort is normal. No respiratory distress. Breath sounds: Normal breath sounds. No stridor. No wheezing, rhonchi or rales. Abdominal:      General: Abdomen is flat. Bowel sounds are normal. There is no distension. Palpations: Abdomen is soft. There is no mass. Tenderness: There is no abdominal tenderness. There is no guarding or rebound. Hernia: No hernia is present. Musculoskeletal:      Cervical back: Normal range of motion. Right lower leg: No edema. Left lower leg: No edema. Skin:     General: Skin is warm and dry. Neurological:      Mental Status: He is alert and oriented to person, place, and time. Mental status is at baseline. Psychiatric:         Mood and Affect: Mood normal.         Behavior: Behavior normal.         Thought Content: Thought content normal.         Judgment: Judgment normal.           Access:   -Central Access Day #:                                   -Peripheral Access Day#:1  -Arterial line Day#:                                  Rudd Day#:  NGT Day#:                                             ETT Day#:  Vent Settings:    / / /     No results for input(s): PHART, WEA8RTB, PO2ART in the last 72 hours.         DATA:       Labs:  CBC:   Recent Labs     03/30/22  0850   WBC 17.3*   HGB 14.2   HCT 43.8          BMP:   Recent Labs     03/30/22  0850   *   K 3.5    CO2 3*   BUN 16   CREATININE 1.0   GLUCOSE 481*     LFT's:   Recent Labs     03/30/22  0850   AST 10*   ALT 16   BILITOT <0.2   ALKPHOS 197*     Troponin:   Recent Labs     03/30/22  0850   TROPONINI <0.01     BNP:No results for input(s): BNP in the last 72 hours. ABGs: No results for input(s): PHART, WWJ8BHM, PO2ART in the last 72 hours. INR: No results for input(s): INR in the last 72 hours. U/A:  Recent Labs     03/30/22  1024   COLORU Yellow   PHUR 5.5   WBCUA 3-5   RBCUA 3-4   BACTERIA 1+*   CLARITYU Clear   SPECGRAV 1.025   LEUKOCYTESUR Negative   UROBILINOGEN 0.2   BILIRUBINUR Negative   BLOODU SMALL*   GLUCOSEU 500*   AMORPHOUS 2+       XR CHEST PORTABLE   Final Result   1. No acute cardiopulmonary abnormality   2. Mild gaseous distention of bowel loops in the left upper quadrant. Consider dedicated abdominal radiograph. XR ABDOMEN (KUB) (SINGLE AP VIEW)    (Results Pending)       EKG:   Echo:  Micro:     ASSESSMENT AND PLAN:   Darnell Field is a 46 y.o. male, who presented with SOB who was found to be in DKA. Diabetic Ketoacidosis  Diabetes Mellitus T2  Patient had used insulin in the past however he has been able to control DM with diet  States he has had poor diet compliance recently  Received 2L NS in ED  - Insulin gtt   - until AG closes x2 then transition  - BMP/Mg/Phos q4hr  -  Trop in ED <0.01, repeat trop pending  - A1c pending  - venous pH q4hr  - Lactate pending  - CK pending  - BCx2, procal pending    Alcohol use disorder;  Hx of marijuana use  Hx of pancreatitis(?)  Lipase elevated to   - Daily LFTs  - iv thiamine  -CIWA w/o ativan  - UDS, ethanol pending    Code Status:Full Code  FEN: Diet NPO  PPX: lovenox  DISPO: ICU    This patient has been staffed and discussed with Tamy Cooper MD.   -----------------------------  Breanne Mercado DO, PGY-1  3/30/2022  06:20 PM     I certify that Darnell Field is expected to be hospitalized for 2 midnights based on the following assessment and plan:      Patient seen and examined, plan of care discussed with residents. Agree with their assessment and plan with following addendum:  Briefly patient is a 82-year-old male who is visiting in [place] from [place], history of alcohol abuse pancreatitis and diabetes mellitus, history of DKA in the past.  Presents with shortness of breath and found to be in DKA with pH 6.8. Admit to ICU on insulin drip and DKA protocol.        Armida Soto MD

## 2022-03-30 NOTE — PROGRESS NOTES
Patient admitted to CCU [id number] from [facility] ER with DKA. Initial BG upon arrival to CCU is 246. Patient is on insulin gtt, titrating as ordered. Patient is alert and oriented x 4. Able to walk from stretcher to bed without assist. BP systolic in 552'S. O2 stable on room air in high 90's. Patient is here in [place], is from [place], no family present at this time. Will continue to monitor.

## 2022-03-30 NOTE — PLAN OF CARE
Problem: Falls - Risk of:  Goal: Will remain free from falls  Description: Will remain free from falls  Outcome: Ongoing  Goal: Absence of physical injury  Description: Absence of physical injury  Outcome: Ongoing     Problem: Discharge Planning:  Goal: Participates in care planning  Description: Participates in care planning  Outcome: Ongoing  Goal: Discharged to appropriate level of care  Description: Discharged to appropriate level of care  Outcome: Ongoing     Problem: Anxiety/Stress:  Goal: Level of anxiety will decrease  Description: Level of anxiety will decrease  Outcome: Ongoing     Problem: Fluid Volume - Imbalance:  Goal: Absence of imbalanced fluid volume signs and symptoms  Description: Absence of imbalanced fluid volume signs and symptoms  Outcome: Ongoing     Problem: Serum Glucose Level - Abnormal:  Goal: Ability to maintain appropriate glucose levels will improve to within specified parameters  Description: Ability to maintain appropriate glucose levels will improve to within specified parameters  Outcome: Ongoing     Problem: Sleep Pattern Disturbance:  Goal: Appears well-rested  Description: Appears well-rested  Outcome: Ongoing

## 2022-03-30 NOTE — CONSULTS
ICU Consult Note       Hospital Day: 1  ICU Day: 1                                                         Code:Limited  Admit Date: 3/30/2022  PCP: No primary care provider on file. CC: SOB    HISTORY OF PRESENT ILLNESS:   Sivakumar Mcmahon is a 46year old male with history of alcoholism and pancreatitis who presents to the ED complaining of SOB. He is here working, as he is from [facility]. In the past few days, he has had progressive SOB. Patient has never had this before.     Patient states that he was obese in the past and had a significant drinking history. He had lost a lot of weight and was self dosing with insulin after a doctor had given him instructions. However he got control of his DM with diet and has been off insulin. Patient endorses constipation and some nausea/vomiting last night.      Patient presented to the [facility] ED tachycardic to 140 and tachypneic to 30/min. Endorses SOB and polyuria. Labwork revealed glucose of 480 and a VBG pH of 6.8. Na at 135, 142 corrected for hyperglycemia, AG at 29, WBC at 17.3, ketones and ketones >=160 in the urine. Small blood in urine. CXR with no acute cardiopulmonary abnormalities. 1+ bacteria. Patient received 2L NS bolus and potassium repletion in the ED and started on insulin gtt.      Patient transferred to [facility] ICU for further evaluation and management. PAST HISTORY:   History reviewed. No pertinent past medical history. History reviewed. No pertinent surgical history. Social History:   The patient lives at home     Alcohol: patient states he has only had one drink this week but has a alcoholic history  Illicit drugs: quit after 25 year history of smoking pot  Tobacco:  none    Family History:  History reviewed. No pertinent family history. MEDICATIONS:     No current facility-administered medications on file prior to encounter. No current outpatient medications on file prior to encounter. Scheduled Meds:   enoxaparin  40 mg SubCUTAneous Daily    sodium chloride flush  5-40 mL IntraVENous 2 times per day      Continuous Infusions:   sodium chloride 1,000 mL (03/30/22 1028)    insulin 6.1 Units/hr (03/30/22 1335)    sodium chloride Stopped (03/30/22 1332)    dextrose 5 % and 0.45 % NaCl 150 mL/hr at 03/30/22 1332    sodium chloride       PRN Meds:potassium chloride, polyethylene glycol, dextrose 5 % and 0.45 % NaCl, magnesium sulfate, sodium phosphate IVPB **OR** sodium phosphate IVPB **OR** sodium phosphate IVPB, dextrose bolus (hypoglycemia) **OR** dextrose bolus (hypoglycemia), sodium chloride flush, sodium chloride    Allergies: No Known Allergies    REVIEW OF SYSTEMS:       History obtained from chart review and the patient    Review of Systems  Constitutional: Positive for chills. Negative for fever. HENT: Negative for sinus pain, sneezing and sore throat. Eyes: Negative for visual disturbance. Respiratory: Positive for cough and shortness of breath. Negative for wheezing. Cardiovascular: Negative for chest pain and palpitations. Gastrointestinal: Positive for constipation, nausea and vomiting. Negative for abdominal distention, abdominal pain and diarrhea. Endocrine: Positive for polyuria. Genitourinary: Negative for dysuria and hematuria. Musculoskeletal: Negative for arthralgias, back pain and myalgias. Neurological: Negative for dizziness, weakness, light-headedness, numbness and headaches. Hematological: Negative. PHYSICAL EXAM:       Vitals: /75   Pulse 90   Temp 97.7 °F (36.5 °C) (Oral)   Resp 11   Ht 6' (1.829 m)   Wt 134 lb 11.2 oz (61.1 kg)   SpO2 100%   BMI 18.27 kg/m²     I/O:      Intake/Output Summary (Last 24 hours) at 3/30/2022 1540  Last data filed at 3/30/2022 1130  Gross per 24 hour   Intake --   Output 1000 ml   Net -1000 ml     I/O this shift:  In: -   Out: 1000 [Urine:1000]  No intake/output data recorded.     Physical Examination:     Physical Exam  Constitutional:       General: He is not in acute distress. Appearance: He is normal weight. He is ill-appearing. He is not toxic-appearing. HENT:      Head: Normocephalic and atraumatic. Right Ear: External ear normal.      Left Ear: External ear normal.      Nose: Nose normal.      Mouth/Throat:      Mouth: Mucous membranes are dry. Pharynx: Oropharynx is clear. Eyes:      General: No scleral icterus. Right eye: No discharge. Left eye: No discharge. Extraocular Movements: Extraocular movements intact. Pupils: Pupils are equal, round, and reactive to light. Cardiovascular:      Rate and Rhythm: Normal rate and regular rhythm. Heart sounds: Normal heart sounds. No murmur heard. No friction rub. No gallop. Pulmonary:      Effort: Pulmonary effort is normal. No respiratory distress. Breath sounds: Normal breath sounds. No stridor. No wheezing, rhonchi or rales. Abdominal:      General: Abdomen is flat. Bowel sounds are normal. There is no distension. Palpations: Abdomen is soft. There is no mass. Tenderness: There is no abdominal tenderness. There is no guarding or rebound. Hernia: No hernia is present. Musculoskeletal:      Cervical back: Normal range of motion. Right lower leg: No edema. Left lower leg: No edema. Skin:     General: Skin is warm and dry. Neurological:      Mental Status: He is alert and oriented to person, place, and time. Mental status is at baseline. Psychiatric:         Mood and Affect: Mood normal.         Behavior: Behavior normal.         Thought Content:  Thought content normal.         Judgment: Judgment normal.     Access:   -Central Access Day #:                                   -Peripheral Access Day#:1  -Arterial line Day#:                                  Rudd Day#:  NGT Day#:                                             ETT Day#:  Vent Settings:    / / /     No results for input(s): PHART, OZK1GVL, PO2ART in the last 72 hours. DATA:       Labs:  CBC:   Recent Labs     03/30/22  0850   WBC 17.3*   HGB 14.2   HCT 43.8          BMP:   Recent Labs     03/30/22  0850   *   K 3.5      CO2 3*   BUN 16   CREATININE 1.0   GLUCOSE 481*   PHOS 3.2     LFT's:   Recent Labs     03/30/22  0850   AST 10*   ALT 16   BILITOT <0.2   ALKPHOS 197*     Troponin:   Recent Labs     03/30/22  0850   TROPONINI <0.01     BNP:No results for input(s): BNP in the last 72 hours. ABGs: No results for input(s): PHART, HCP9KGU, PO2ART in the last 72 hours. INR: No results for input(s): INR in the last 72 hours. U/A:  Recent Labs     03/30/22  1024   COLORU Yellow   PHUR 5.5   WBCUA 3-5   RBCUA 3-4   BACTERIA 1+*   CLARITYU Clear   SPECGRAV 1.025   LEUKOCYTESUR Negative   UROBILINOGEN 0.2   BILIRUBINUR Negative   BLOODU SMALL*   GLUCOSEU 500*   AMORPHOUS 2+       XR ABDOMEN (KUB) (SINGLE AP VIEW)   Final Result       1. Nonobstructive bowel gas pattern with large stool burden. XR CHEST PORTABLE   Final Result   1. No acute cardiopulmonary abnormality   2. Mild gaseous distention of bowel loops in the left upper quadrant. Consider dedicated abdominal radiograph. EKG:   Echo:  Micro:     ASSESSMENT AND PLAN:   Lay Garcia is a 46 y.o. male, who presented with SOB who was found to be in DKA.      Diabetic Ketoacidosis  Diabetes Mellitus T2  Patient had used insulin in the past however he has been able to control DM with diet  States he has had poor diet compliance recently  AG 29, >160 ketones in urine,   Received 2L NS in ED  - Insulin gtt              - until AG closes x2 then transition  - BMP/Mg/Phos q4hr  -  Trop in ED <0.01, repeat trop pending  - A1c pending  - venous pH q4hr   -6.8 in ED, 7.0 upon arrival to ICU   - can d/c if going in right direction  - Lactate pending  - CK pending  - BCx2, procal pending     Alcohol use disorder;  Hx

## 2022-03-31 LAB
ANION GAP SERPL CALCULATED.3IONS-SCNC: 10 MMOL/L (ref 3–16)
ANION GAP SERPL CALCULATED.3IONS-SCNC: 11 MMOL/L (ref 3–16)
ANION GAP SERPL CALCULATED.3IONS-SCNC: 9 MMOL/L (ref 3–16)
BUN BLDV-MCNC: 10 MG/DL (ref 7–20)
BUN BLDV-MCNC: 11 MG/DL (ref 7–20)
BUN BLDV-MCNC: 12 MG/DL (ref 7–20)
CALCIUM SERPL-MCNC: 8 MG/DL (ref 8.3–10.6)
CALCIUM SERPL-MCNC: 8.2 MG/DL (ref 8.3–10.6)
CALCIUM SERPL-MCNC: 8.4 MG/DL (ref 8.3–10.6)
CHLORIDE BLD-SCNC: 113 MMOL/L (ref 99–110)
CHLORIDE BLD-SCNC: 114 MMOL/L (ref 99–110)
CHLORIDE BLD-SCNC: 114 MMOL/L (ref 99–110)
CO2: 11 MMOL/L (ref 21–32)
CO2: 11 MMOL/L (ref 21–32)
CO2: 14 MMOL/L (ref 21–32)
CREAT SERPL-MCNC: 0.7 MG/DL (ref 0.9–1.3)
CREAT SERPL-MCNC: 0.8 MG/DL (ref 0.9–1.3)
CREAT SERPL-MCNC: 0.8 MG/DL (ref 0.9–1.3)
ESTIMATED AVERAGE GLUCOSE: 292 MG/DL
FOLATE: 7.61 NG/ML (ref 4.78–24.2)
GFR AFRICAN AMERICAN: >60
GFR NON-AFRICAN AMERICAN: >60
GLUCOSE BLD-MCNC: 107 MG/DL (ref 70–99)
GLUCOSE BLD-MCNC: 125 MG/DL (ref 70–99)
GLUCOSE BLD-MCNC: 132 MG/DL (ref 70–99)
GLUCOSE BLD-MCNC: 160 MG/DL (ref 70–99)
GLUCOSE BLD-MCNC: 161 MG/DL (ref 70–99)
GLUCOSE BLD-MCNC: 196 MG/DL (ref 70–99)
GLUCOSE BLD-MCNC: 201 MG/DL (ref 70–99)
GLUCOSE BLD-MCNC: 207 MG/DL (ref 70–99)
GLUCOSE BLD-MCNC: 217 MG/DL (ref 70–99)
GLUCOSE BLD-MCNC: 82 MG/DL (ref 70–99)
GLUCOSE BLD-MCNC: 83 MG/DL (ref 70–99)
HBA1C MFR BLD: 11.8 %
MAGNESIUM: 1.6 MG/DL (ref 1.8–2.4)
MAGNESIUM: 1.6 MG/DL (ref 1.8–2.4)
MAGNESIUM: 2.1 MG/DL (ref 1.8–2.4)
PERFORMED ON: ABNORMAL
PERFORMED ON: NORMAL
PERFORMED ON: NORMAL
PH VENOUS: 7.24 (ref 7.35–7.45)
PH VENOUS: 7.27 (ref 7.35–7.45)
PH VENOUS: 7.29 (ref 7.35–7.45)
PHOSPHORUS: 2.3 MG/DL (ref 2.5–4.9)
PHOSPHORUS: 2.5 MG/DL (ref 2.5–4.9)
PHOSPHORUS: 2.5 MG/DL (ref 2.5–4.9)
POTASSIUM SERPL-SCNC: 2.8 MMOL/L (ref 3.5–5.1)
POTASSIUM SERPL-SCNC: 3 MMOL/L (ref 3.5–5.1)
POTASSIUM SERPL-SCNC: 3.4 MMOL/L (ref 3.5–5.1)
REASON FOR REJECTION: NORMAL
REJECTED TEST: NORMAL
SODIUM BLD-SCNC: 135 MMOL/L (ref 136–145)
SODIUM BLD-SCNC: 136 MMOL/L (ref 136–145)
SODIUM BLD-SCNC: 136 MMOL/L (ref 136–145)
VITAMIN B-12: 1509 PG/ML (ref 211–911)

## 2022-03-31 PROCEDURE — 2580000003 HC RX 258: Performed by: STUDENT IN AN ORGANIZED HEALTH CARE EDUCATION/TRAINING PROGRAM

## 2022-03-31 PROCEDURE — 84100 ASSAY OF PHOSPHORUS: CPT

## 2022-03-31 PROCEDURE — 83735 ASSAY OF MAGNESIUM: CPT

## 2022-03-31 PROCEDURE — 82800 BLOOD PH: CPT

## 2022-03-31 PROCEDURE — 6370000000 HC RX 637 (ALT 250 FOR IP)

## 2022-03-31 PROCEDURE — 6370000000 HC RX 637 (ALT 250 FOR IP): Performed by: STUDENT IN AN ORGANIZED HEALTH CARE EDUCATION/TRAINING PROGRAM

## 2022-03-31 PROCEDURE — 1200000000 HC SEMI PRIVATE

## 2022-03-31 PROCEDURE — 6360000002 HC RX W HCPCS: Performed by: STUDENT IN AN ORGANIZED HEALTH CARE EDUCATION/TRAINING PROGRAM

## 2022-03-31 PROCEDURE — 2500000003 HC RX 250 WO HCPCS: Performed by: STUDENT IN AN ORGANIZED HEALTH CARE EDUCATION/TRAINING PROGRAM

## 2022-03-31 PROCEDURE — 36415 COLL VENOUS BLD VENIPUNCTURE: CPT

## 2022-03-31 PROCEDURE — 82607 VITAMIN B-12: CPT

## 2022-03-31 PROCEDURE — 99232 SBSQ HOSP IP/OBS MODERATE 35: CPT | Performed by: INTERNAL MEDICINE

## 2022-03-31 PROCEDURE — 80048 BASIC METABOLIC PNL TOTAL CA: CPT

## 2022-03-31 PROCEDURE — 94761 N-INVAS EAR/PLS OXIMETRY MLT: CPT

## 2022-03-31 PROCEDURE — 82746 ASSAY OF FOLIC ACID SERUM: CPT

## 2022-03-31 RX ORDER — SODIUM BICARBONATE 650 MG/1
650 TABLET ORAL 2 TIMES DAILY
Status: DISCONTINUED | OUTPATIENT
Start: 2022-03-31 | End: 2022-03-31

## 2022-03-31 RX ORDER — INSULIN LISPRO 100 [IU]/ML
0-6 INJECTION, SOLUTION INTRAVENOUS; SUBCUTANEOUS NIGHTLY
Status: DISCONTINUED | OUTPATIENT
Start: 2022-03-31 | End: 2022-04-01

## 2022-03-31 RX ORDER — CALCIUM GLUCONATE 10 MG/ML
1000 INJECTION, SOLUTION INTRAVENOUS ONCE
Status: COMPLETED | OUTPATIENT
Start: 2022-03-31 | End: 2022-03-31

## 2022-03-31 RX ORDER — INSULIN LISPRO 100 [IU]/ML
12 INJECTION, SOLUTION INTRAVENOUS; SUBCUTANEOUS
Status: DISCONTINUED | OUTPATIENT
Start: 2022-03-31 | End: 2022-03-31

## 2022-03-31 RX ORDER — SODIUM BICARBONATE 650 MG/1
650 TABLET ORAL 2 TIMES DAILY
Status: COMPLETED | OUTPATIENT
Start: 2022-03-31 | End: 2022-03-31

## 2022-03-31 RX ORDER — MULTIVITAMIN WITH IRON
1 TABLET ORAL DAILY
Status: DISCONTINUED | OUTPATIENT
Start: 2022-03-31 | End: 2022-04-01 | Stop reason: HOSPADM

## 2022-03-31 RX ORDER — POTASSIUM CHLORIDE 20 MEQ/1
40 TABLET, EXTENDED RELEASE ORAL ONCE
Status: COMPLETED | OUTPATIENT
Start: 2022-03-31 | End: 2022-03-31

## 2022-03-31 RX ORDER — GAUZE BANDAGE 2" X 2"
100 BANDAGE TOPICAL DAILY
Status: DISCONTINUED | OUTPATIENT
Start: 2022-03-31 | End: 2022-04-01 | Stop reason: HOSPADM

## 2022-03-31 RX ORDER — DEXTROSE MONOHYDRATE 100 MG/ML
12.5 INJECTION, SOLUTION INTRAVENOUS PRN
Status: DISCONTINUED | OUTPATIENT
Start: 2022-03-31 | End: 2022-04-01 | Stop reason: HOSPADM

## 2022-03-31 RX ORDER — DEXTROSE MONOHYDRATE 50 MG/ML
100 INJECTION, SOLUTION INTRAVENOUS PRN
Status: DISCONTINUED | OUTPATIENT
Start: 2022-03-31 | End: 2022-04-01 | Stop reason: HOSPADM

## 2022-03-31 RX ORDER — INSULIN LISPRO 100 [IU]/ML
0-12 INJECTION, SOLUTION INTRAVENOUS; SUBCUTANEOUS
Status: DISCONTINUED | OUTPATIENT
Start: 2022-03-31 | End: 2022-04-01

## 2022-03-31 RX ADMIN — INSULIN GLARGINE 35 UNITS: 100 INJECTION, SOLUTION SUBCUTANEOUS at 01:29

## 2022-03-31 RX ADMIN — POTASSIUM CHLORIDE 40 MEQ: 1500 TABLET, EXTENDED RELEASE ORAL at 07:20

## 2022-03-31 RX ADMIN — POTASSIUM CHLORIDE 10 MEQ: 10 INJECTION, SOLUTION INTRAVENOUS at 10:08

## 2022-03-31 RX ADMIN — MAGNESIUM SULFATE HEPTAHYDRATE 1000 MG: 1 INJECTION, SOLUTION INTRAVENOUS at 06:29

## 2022-03-31 RX ADMIN — POTASSIUM CHLORIDE 40 MEQ: 20 TABLET, EXTENDED RELEASE ORAL at 01:07

## 2022-03-31 RX ADMIN — INSULIN LISPRO 4 UNITS: 100 INJECTION, SOLUTION INTRAVENOUS; SUBCUTANEOUS at 17:32

## 2022-03-31 RX ADMIN — INSULIN LISPRO 4 UNITS: 100 INJECTION, SOLUTION INTRAVENOUS; SUBCUTANEOUS at 03:42

## 2022-03-31 RX ADMIN — POTASSIUM CHLORIDE 10 MEQ: 10 INJECTION, SOLUTION INTRAVENOUS at 06:24

## 2022-03-31 RX ADMIN — POTASSIUM CHLORIDE 40 MEQ: 20 TABLET, EXTENDED RELEASE ORAL at 06:09

## 2022-03-31 RX ADMIN — SODIUM PHOSPHATE, MONOBASIC, MONOHYDRATE AND SODIUM PHOSPHATE, DIBASIC, ANHYDROUS 10 MMOL: 276; 142 INJECTION, SOLUTION INTRAVENOUS at 01:23

## 2022-03-31 RX ADMIN — CALCIUM GLUCONATE 1000 MG: 10 INJECTION, SOLUTION INTRAVENOUS at 17:03

## 2022-03-31 RX ADMIN — POTASSIUM CHLORIDE 10 MEQ: 10 INJECTION, SOLUTION INTRAVENOUS at 09:08

## 2022-03-31 RX ADMIN — MAGNESIUM SULFATE HEPTAHYDRATE 1000 MG: 1 INJECTION, SOLUTION INTRAVENOUS at 07:13

## 2022-03-31 RX ADMIN — ENOXAPARIN SODIUM 40 MG: 100 INJECTION SUBCUTANEOUS at 07:20

## 2022-03-31 RX ADMIN — POTASSIUM CHLORIDE 10 MEQ: 10 INJECTION, SOLUTION INTRAVENOUS at 00:50

## 2022-03-31 RX ADMIN — SODIUM BICARBONATE 650 MG TABLET 650 MG: at 18:07

## 2022-03-31 RX ADMIN — INSULIN LISPRO 2 UNITS: 100 INJECTION, SOLUTION INTRAVENOUS; SUBCUTANEOUS at 20:48

## 2022-03-31 RX ADMIN — THERA TABS 1 TABLET: TAB at 09:08

## 2022-03-31 RX ADMIN — POTASSIUM CHLORIDE 10 MEQ: 10 INJECTION, SOLUTION INTRAVENOUS at 07:20

## 2022-03-31 RX ADMIN — THIAMINE HCL TAB 100 MG 100 MG: 100 TAB at 09:08

## 2022-03-31 RX ADMIN — SODIUM CHLORIDE, PRESERVATIVE FREE 10 ML: 5 INJECTION INTRAVENOUS at 20:19

## 2022-03-31 RX ADMIN — SODIUM PHOSPHATE, MONOBASIC, MONOHYDRATE AND SODIUM PHOSPHATE, DIBASIC, ANHYDROUS 10 MMOL: 276; 142 INJECTION, SOLUTION INTRAVENOUS at 06:47

## 2022-03-31 RX ADMIN — POTASSIUM CHLORIDE 40 MEQ: 20 TABLET, EXTENDED RELEASE ORAL at 16:37

## 2022-03-31 RX ADMIN — SODIUM CHLORIDE, PRESERVATIVE FREE 10 ML: 5 INJECTION INTRAVENOUS at 07:35

## 2022-03-31 ASSESSMENT — PAIN SCALES - GENERAL
PAINLEVEL_OUTOF10: 0

## 2022-03-31 NOTE — PROGRESS NOTES
Ran 2200 dose of K+ under PRN. Held 2200 schedule dose because of this. See 2200 dose ran under PRN K+ order.

## 2022-03-31 NOTE — PLAN OF CARE
Problem: Falls - Risk of:  Goal: Will remain free from falls  Description: Will remain free from falls  3/31/2022 0516 by Cata Bhandari RN  Outcome: Ongoing     Problem: Falls - Risk of:  Goal: Absence of physical injury  Description: Absence of physical injury  3/31/2022 0516 by Cata Bhandari RN  Outcome: Ongoing     Problem: Discharge Planning:  Goal: Participates in care planning  Description: Participates in care planning  3/31/2022 0516 by Cata Bhandari RN  Outcome: Ongoing     Problem: Discharge Planning:  Goal: Discharged to appropriate level of care  Description: Discharged to appropriate level of care  3/31/2022 0516 by Cata Bhandari RN  Outcome: Ongoing     Problem: Sleep Pattern Disturbance:  Goal: Appears well-rested  Description: Appears well-rested  3/31/2022 0516 by Cata Bhandari RN  Outcome: Ongoing

## 2022-03-31 NOTE — PROGRESS NOTES
ICU Progress Note    Admit Date: 3/30/2022  Day: 2  Diet: ADULT DIET; Regular; 3 carb choices (45 gm/meal)    CC: SOB    Interval history: Overnight, AG closed x2, patient transitioned to subq insulin . Patient seen at bedside, comfortable with no new symptoms or concerns. Patient denies CP, SOB, Abdominal Pain, Nausea/Vomiting, Diarrhea, Constipation, Urinary/bladder complaints. HPI: Maryellen Mcclure is a 46year old male with history of alcoholism and pancreatitis who presents to the ED complaining of SOB. He is here working, as he is from [place]. In the past few days, he has had progressive SOB. Patient has never had this before.     Patient states that he was obese in the past and had a significant drinking history. He had lost a lot of weight and was self dosing with insulin after a doctor had given him instructions. However he got control of his DM with diet and has been off insulin. Patient endorses constipation and some nausea/vomiting last night.      Patient presented to the [facility] ED tachycardic to 140 and tachypneic to 30/min.  Endorses SOB and polyuria. Labwork revealed glucose of 480 and a VBG pH of 6.8. Na at 135, 142 corrected for hyperglycemia, AG at 29, WBC at 17.3, ketones and ketones >=160 in the urine. Small blood in urine. CXR with no acute cardiopulmonary abnormalities.  1+ bacteria.  Patient received 2L NS bolus and potassium repletion in the ED and started on insulin gtt.      Patient transferred to [facility] ICU for further evaluation and management.     Medications:     Scheduled Meds:   insulin glargine  35 Units SubCUTAneous Nightly    insulin lispro  12 Units SubCUTAneous TID WC    insulin lispro  0-12 Units SubCUTAneous TID WC    insulin lispro  0-6 Units SubCUTAneous Nightly    enoxaparin  40 mg SubCUTAneous Daily    sodium chloride flush  5-40 mL IntraVENous 2 times per day    thiamine (VITAMIN B1) IVPB  100 mg IntraVENous Q24H     Continuous Infusions:   dextrose  dextrose      sodium chloride 1,000 mL (03/30/22 1028)    insulin Stopped (03/31/22 0300)    sodium chloride Stopped (03/30/22 1332)    dextrose 5 % and 0.45 % NaCl Stopped (03/31/22 0300)    sodium chloride       PRN Meds:glucose, dextrose, glucagon (rDNA), dextrose, potassium chloride, polyethylene glycol, dextrose 5 % and 0.45 % NaCl, magnesium sulfate, sodium phosphate IVPB **OR** sodium phosphate IVPB **OR** sodium phosphate IVPB, dextrose bolus (hypoglycemia) **OR** dextrose bolus (hypoglycemia), sodium chloride flush, sodium chloride    Objective:   Vitals:   T-max:  Patient Vitals for the past 8 hrs:   BP Temp Temp src Pulse Resp SpO2 Weight   03/31/22 0600 101/76 -- -- 84 10 -- --   03/31/22 0518 -- -- -- -- -- -- 134 lb 7.7 oz (61 kg)   03/31/22 0500 118/84 -- -- 81 10 -- --   03/31/22 0400 119/81 98 °F (36.7 °C) Oral 89 10 100 % --   03/31/22 0300 106/72 -- -- 94 12 -- --   03/31/22 0200 113/67 -- -- 81 12 -- --   03/31/22 0100 107/77 -- -- 80 12 -- --   03/31/22 0032 122/82 -- -- 91 -- -- --   03/31/22 0000 122/82 98 °F (36.7 °C) Oral 91 10 99 % --       Intake/Output Summary (Last 24 hours) at 3/31/2022 0726  Last data filed at 3/31/2022 6479  Gross per 24 hour   Intake 3756.43 ml   Output 1000 ml   Net 2756.43 ml       Review of Systems  Constitutional: Positive for chills. Negative for fever. HENT: Negative for sinus pain, sneezing and sore throat.    Eyes: Negative for visual disturbance. Respiratory: Positive for cough and shortness of breath. Negative for wheezing.    Cardiovascular: Negative for chest pain and palpitations. Gastrointestinal: Positive for constipation, nausea and vomiting. Negative for abdominal distention, abdominal pain and diarrhea. Endocrine: Positive for polyuria. Genitourinary: Negative for dysuria and hematuria. Musculoskeletal: Negative for arthralgias, back pain and myalgias.    Neurological: Negative for dizziness, weakness, light-headedness, numbness and headaches. Hematological: Negative.     Physical Exam  Constitutional:       General: He is not in acute distress.     Appearance: He is normal weight. He is ill-appearing. He is not toxic-appearing. HENT:      Head: Normocephalic and atraumatic.      Right Ear: External ear normal.      Left Ear: External ear normal.      Nose: Nose normal.      Mouth/Throat:      Mouth: Mucous membranes are dry.      Pharynx: Oropharynx is clear. Eyes:      General: No scleral icterus.        Right eye: No discharge.         Left eye: No discharge.      Extraocular Movements: Extraocular movements intact.      Pupils: Pupils are equal, round, and reactive to light. Cardiovascular:      Rate and Rhythm: Normal rate and regular rhythm.      Heart sounds: Normal heart sounds. No murmur heard. No friction rub. No gallop.    Pulmonary:      Effort: Pulmonary effort is normal. No respiratory distress.      Breath sounds: Normal breath sounds. No stridor. No wheezing, rhonchi or rales. Abdominal:      General: Abdomen is flat. Bowel sounds are normal. There is no distension.      Palpations: Abdomen is soft. There is no mass.      Tenderness: There is no abdominal tenderness. There is no guarding or rebound.      Hernia: No hernia is present. Musculoskeletal:      Cervical back: Normal range of motion.      Right lower leg: No edema.      Left lower leg: No edema. Skin:     General: Skin is warm and dry. Neurological:      Mental Status: He is alert and oriented to person, place, and time. Mental status is at baseline. Psychiatric:         Mood and Affect: Mood normal.         BehaviorLotus Crystal         Thought Content:  Thought content normal.         Judgment: Judgment normal.     LABS:    CBC:   Recent Labs     03/30/22  0850 03/30/22  1525   WBC 17.3* 11.2*   HGB 14.2 13.4*   HCT 43.8 39.2*    204   .0 95.8     Renal:    Recent Labs 03/30/22 2022 03/31/22  0030 03/31/22  0453    135* 136   K 2.7* 3.0* 2.8*   * 114* 114*   CO2 9* 11* 11*   BUN 12 12 11   CREATININE 0.8* 0.8* 0.8*   GLUCOSE 195* 161* 132*   CALCIUM 8.0* 8.2* 8.4   MG 1.60* 1.60* 1.60*   PHOS 1.6* 2.3* 2.5   ANIONGAP 13 10 11     Hepatic:   Recent Labs     03/30/22  0850   AST 10*   ALT 16   BILITOT <0.2   PROT 7.5   LABALBU 4.6   ALKPHOS 197*     Troponin:   Recent Labs     03/30/22  0850 03/30/22  1525   TROPONINI <0.01 <0.01     BNP: No results for input(s): BNP in the last 72 hours. Lipids: No results for input(s): CHOL, HDL in the last 72 hours. Invalid input(s): LDLCALCU, TRIGLYCERIDE  ABGs:  No results for input(s): PHART, MFO5RDE, PO2ART, JNE4PDO, BEART, THGBART, J0RKKISN, ROY0MAW in the last 72 hours. INR: No results for input(s): INR in the last 72 hours. Lactate:   Recent Labs     03/30/22  1301   LACTATE 1.52     Cultures:  -----------------------------------------------------------------  RAD:   XR ABDOMEN (KUB) (SINGLE AP VIEW)   Final Result       1. Nonobstructive bowel gas pattern with large stool burden. XR CHEST PORTABLE   Final Result   1. No acute cardiopulmonary abnormality   2. Mild gaseous distention of bowel loops in the left upper quadrant. Consider dedicated abdominal radiograph. Assessment/Plan:   Ronak Gonzalez a 46 y. o. male, who presented with SOB who was found to be in DKA.      Diabetic Ketoacidosis  Diabetes Mellitus T2  Patient had used insulin in the past however he has been able to control DM with diet  States he has had poor diet compliance recently  AG 29, >160 ketones in urine, POA  Received 2L NS in ED  - Insulin gtt d/c              -  Transitioned to lantus 24u qPM   - MDSSI  - BMP/Mg/Phos daily  -  Trop in ED <0.01, repeat trop <0.01  - A1c 11.8  - venous pH q4hr              -6.8 in ED, 7.0 upon arrival to ICU  - Lactate 1.2  -   - BCx2, procal 0.28  - UA low suspicion for infection     Alcohol use disorder; Hx of marijuana use  Hx of pancreatitis(?)  Lipase elevated to   - multivitamin  - thiamine  -CIWA w/o ativan  - UDS negative, ethanol negative    Code Status:Limited no to intubation  FEN: ADULT DIET; Regular; 3 carb choices (45 gm/meal)  PPX: lovenox  DISPO: ICU vs GMF    Patient is improving and no longer needs insulin gtt. Possible downgrade to F    Carlo Castillo DO, PGY-1  03/31/22  7:26 AM    This patient has been staffed and discussed with Varsha Watt MD.     Patient seen, examined and discussed with the resident and I agree with the assessment and plan as edited above. Anion gap closed. Still has a nongap acidosis and other electrolyte abnormalities, but is doing much better today. A1c was 11.8 as expected and supporting the theory that this was the result of patient non-adherence to therapy.   Can transfer out of ICU today to med surg       Bart Jones MD
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
## 2022-03-31 NOTE — PROGRESS NOTES
Pt blood sugar reading 89 this am, pt VSS, resting quietly in bed. Electrolyte replacements currently infusing, labs will be drawn upon completion of infusion. Pt ate 100% of breakfast. Will continue to monitor.

## 2022-03-31 NOTE — PROGRESS NOTES
Hospitalist Progress Note      PCP: No primary care provider on file. Date of Admission: 3/30/2022    Chief Complaint on Admission: shortness of breath    Pt Seen/Examined and Chart Reviewed. Admitting dx : DKA    SUBJECTIVE/OBJECTIVE:   Patient was seen in ICU, reports doing much better today, tolerating p.o. DKA resolved, he was transitioned to subcu insulin. Allergies  Patient has no known allergies. Medications      Scheduled Meds:   insulin lispro  0-12 Units SubCUTAneous TID WC    insulin lispro  0-6 Units SubCUTAneous Nightly    insulin glargine  24 Units SubCUTAneous Nightly    thiamine mononitrate  100 mg Oral Daily    multivitamin  1 tablet Oral Daily    potassium chloride  40 mEq Oral Once    sodium bicarbonate  650 mg Oral BID    calcium gluconate  1,000 mg IntraVENous Once    enoxaparin  40 mg SubCUTAneous Daily    sodium chloride flush  5-40 mL IntraVENous 2 times per day       Infusions:   dextrose      dextrose      sodium chloride         PRN Meds:  glucose, dextrose, glucagon (rDNA), dextrose, polyethylene glycol, dextrose bolus (hypoglycemia) **OR** dextrose bolus (hypoglycemia), sodium chloride flush, sodium chloride    Vitals    TEMPERATURE:  Current - Temp: 97.8 °F (36.6 °C); Max - Temp  Av °F (36.7 °C)  Min: 97.8 °F (36.6 °C)  Max: 98.1 °F (36.7 °C)  RESPIRATIONS RANGE: Resp  Av.6  Min: 9  Max: 17  PULSE RANGE: Pulse  Av.2  Min: 68  Max: 95  BLOOD PRESSURE RANGE:  Systolic (70XOU), JDH:078 , Min:101 , AFT:615   ; Diastolic (38YYE), WIR:66, Min:67, Max:88    PULSE OXIMETRY RANGE: SpO2  Av %  Min: 98 %  Max: 100 %  24HR INTAKE/OUTPUT:      Intake/Output Summary (Last 24 hours) at 3/31/2022 1602  Last data filed at 3/31/2022 1336  Gross per 24 hour   Intake 4236.43 ml   Output 0 ml   Net 4236.43 ml       Exam:      General appearance: No apparent distress, appears stated age and cooperative.   Lungs: Clear to ascultation, bilaterally without Rales/Wheezes/Rhonchi with good respiratory effort. Heart: Regular rate and rhythm with Normal S1/S2 without  murmurs, rubs or gallops, point of maximum impulse non-displaced  Abdomen: Soft, non-tender or non-distended without rigidity or guarding and positive bowel sounds all four quadrants. Extremities: No clubbing, cyanosis, or edema bilaterally. Skin: Skin color, texture, turgor normal.    Neurologic: Alert and oriented X 3,  grossly non-focal.  Mental status: Alert, oriented, thought content appropriate. Data    Recent Labs     03/30/22  0850 03/30/22  1525   WBC 17.3* 11.2*   HGB 14.2 13.4*   HCT 43.8 39.2*    204      Recent Labs     03/31/22  0030 03/31/22  0453 03/31/22  1426   * 136 136   K 3.0* 2.8* 3.4*   * 114* 113*   CO2 11* 11* 14*   PHOS 2.3* 2.5 2.5   BUN 12 11 10   CREATININE 0.8* 0.8* 0.7*     Recent Labs     03/30/22  0850   AST 10*   ALT 16   BILITOT <0.2   ALKPHOS 197*     No results for input(s): INR in the last 72 hours.   Recent Labs     03/30/22  0850 03/30/22  1525   CKTOTAL  --  115   TROPONINI <0.01 <0.01       Consults:     IP CONSULT TO SOCIAL WORK  IP CONSULT TO CRITICAL CARE    Active Hospital Problems    Diagnosis Date Noted    DKA, type 2, not at goal [facility]) [E11.10] 03/30/2022    Diabetic acidosis without coma ([facility] [address] [E11.10] 03/30/2022         ASSESSMENT AND PLAN      DKA:  Transitioned from insulin drip to subcu insulin  A1c 11.8%  Agree with lowering the dose for tonight to 24 units  Will monitor blood sugars overnight  We will plan to discharge tomorrow if stable  Will need all diabetic supplies  Patient is out of state, lives in [place] and intends to travel back in the last few days  We can give referral to [facility] outpatient medical clinic if he stays longer for urgent follow-up visit    SIRS:  Present on admission, noninfectious in etiology, related to DKA  Continue to observe off antibiotics    Hypokalemia hypomagnesemia:  Replace and monitor closely    DVT Prophylaxis: Lovenox  Diet: ADULT DIET; Regular; 3 carb choices (45 gm/meal)  Code Status: Limited    PT/OT Eval Status:  At baseline    Dispo -inpatient    Griselda Saldivar MD

## 2022-03-31 NOTE — PROGRESS NOTES
Insulin and Dextrose 5% and .45% sodium chloride turned off at 0300. Lantus given with snack at 0100.

## 2022-03-31 NOTE — PLAN OF CARE
Problem: Falls - Risk of:  Goal: Will remain free from falls  Description: Will remain free from falls  3/31/2022 1052 by Dilia Echeverria RN  Outcome: Ongoing  Goal: Absence of physical injury  Description: Absence of physical injury  3/31/2022 1052 by Dilia Echeverria RN  Outcome: Ongoing     Problem: Discharge Planning:  Goal: Participates in care planning  Description: Participates in care planning  3/31/2022 1052 by Dilia Echeverria RN  Outcome: Ongoing  Goal: Discharged to appropriate level of care  Description: Discharged to appropriate level of care  3/31/2022 1052 by Dilia Echeverria RN  Outcome: Ongoing    Problem: Anxiety/Stress:  Goal: Level of anxiety will decrease  Description: Level of anxiety will decrease  Outcome: Ongoing     Problem: Fluid Volume - Imbalance:  Goal: Absence of imbalanced fluid volume signs and symptoms  Description: Absence of imbalanced fluid volume signs and symptoms  Outcome: Ongoing     Problem: Serum Glucose Level - Abnormal:  Goal: Ability to maintain appropriate glucose levels will improve to within specified parameters    Description: Ability to maintain appropriate glucose levels will improve to within specified parameters  Outcome: Ongoing     Problem: Sleep Pattern Disturbance:  Goal: Appears well-rested  Description: Appears well-rested  3/31/2022 1052 by Dilia Echeverria RN  Outcome: Ongoing

## 2022-03-31 NOTE — PROGRESS NOTES
Pt transferring to 3 S. Report given to Jassi Pedraza (RN). Melody Lan (ICU RN)mwill be trasnsferring pt by wheelchair.

## 2022-03-31 NOTE — PROGRESS NOTES
Patient remains of an Insulin drip. Titrated per order. Patient is alert and oriented X4. Patient is on room air and stating 98. Lungs sound clear. CIWA score of 0 calculated. Patient currently resting.

## 2022-03-31 NOTE — PROGRESS NOTES
Physician Progress Note      Sarah THRASHER #:                  [id number]  :                       1980  ADMIT DATE:       3/30/2022 8:44 AM  DISCH DATE:  RESPONDING  PROVIDER #:        Staci Zavala MD          QUERY TEXT:    Patient admitted with DKA and noted to have SIRS. If possible, please document   in progress notes and discharge summary if you are evaluating and/or treating   any of the following: The medical record reflects the following:  Risk Factors: 45 yo W/ DKA  Clinical Indicators: WBC 17.3, , RR 30  Treatment: lab monitoring, vitals, blood cultures, CXR, UA  Options provided:  -- SIRS of non-infectious origin without acute organ dysfunction  -- Other - I will add my own diagnosis  -- Disagree - Not applicable / Not valid  -- Disagree - Clinically unable to determine / Unknown  -- Refer to Clinical Documentation Reviewer    PROVIDER RESPONSE TEXT:    This patient has SIRS of non-infectious origin without acute organ   dysfunction.     Query created by: Alisa Hale on 3/31/2022 8:38 AM      Electronically signed by:  Staci Zavala MD 3/31/2022 2:30 PM

## 2022-04-01 VITALS
BODY MASS INDEX: 18.36 KG/M2 | SYSTOLIC BLOOD PRESSURE: 106 MMHG | DIASTOLIC BLOOD PRESSURE: 60 MMHG | OXYGEN SATURATION: 97 % | TEMPERATURE: 97.7 F | RESPIRATION RATE: 16 BRPM | HEIGHT: 72 IN | WEIGHT: 135.58 LBS | HEART RATE: 70 BPM

## 2022-04-01 LAB
ANION GAP SERPL CALCULATED.3IONS-SCNC: 10 MMOL/L (ref 3–16)
BUN BLDV-MCNC: 12 MG/DL (ref 7–20)
CALCIUM SERPL-MCNC: 8.2 MG/DL (ref 8.3–10.6)
CHLORIDE BLD-SCNC: 110 MMOL/L (ref 99–110)
CO2: 17 MMOL/L (ref 21–32)
CREAT SERPL-MCNC: 0.8 MG/DL (ref 0.9–1.3)
GFR AFRICAN AMERICAN: >60
GFR NON-AFRICAN AMERICAN: >60
GLUCOSE BLD-MCNC: 108 MG/DL (ref 70–99)
GLUCOSE BLD-MCNC: 197 MG/DL (ref 70–99)
GLUCOSE BLD-MCNC: 231 MG/DL (ref 70–99)
GLUCOSE BLD-MCNC: 69 MG/DL (ref 70–99)
GLUCOSE BLD-MCNC: 80 MG/DL (ref 70–99)
GLUCOSE BLD-MCNC: 92 MG/DL (ref 70–99)
MAGNESIUM: 2.1 MG/DL (ref 1.8–2.4)
PERFORMED ON: ABNORMAL
PERFORMED ON: NORMAL
PHOSPHORUS: 2.2 MG/DL (ref 2.5–4.9)
POTASSIUM SERPL-SCNC: 3.1 MMOL/L (ref 3.5–5.1)
SODIUM BLD-SCNC: 137 MMOL/L (ref 136–145)

## 2022-04-01 PROCEDURE — 83735 ASSAY OF MAGNESIUM: CPT

## 2022-04-01 PROCEDURE — 6360000002 HC RX W HCPCS: Performed by: STUDENT IN AN ORGANIZED HEALTH CARE EDUCATION/TRAINING PROGRAM

## 2022-04-01 PROCEDURE — 36415 COLL VENOUS BLD VENIPUNCTURE: CPT

## 2022-04-01 PROCEDURE — 6370000000 HC RX 637 (ALT 250 FOR IP): Performed by: INTERNAL MEDICINE

## 2022-04-01 PROCEDURE — 84100 ASSAY OF PHOSPHORUS: CPT

## 2022-04-01 PROCEDURE — 80048 BASIC METABOLIC PNL TOTAL CA: CPT

## 2022-04-01 PROCEDURE — 6370000000 HC RX 637 (ALT 250 FOR IP): Performed by: STUDENT IN AN ORGANIZED HEALTH CARE EDUCATION/TRAINING PROGRAM

## 2022-04-01 RX ORDER — POTASSIUM CHLORIDE 20 MEQ/1
20 TABLET, EXTENDED RELEASE ORAL DAILY
Qty: 7 TABLET | Refills: 0 | Status: SHIPPED | OUTPATIENT
Start: 2022-04-01 | End: 2022-04-08

## 2022-04-01 RX ORDER — INSULIN LISPRO 100 [IU]/ML
0-6 INJECTION, SOLUTION INTRAVENOUS; SUBCUTANEOUS
Status: DISCONTINUED | OUTPATIENT
Start: 2022-04-01 | End: 2022-04-01 | Stop reason: HOSPADM

## 2022-04-01 RX ORDER — LANCETS 30 GAUGE
1 EACH MISCELLANEOUS 4 TIMES DAILY
Qty: 200 EACH | Refills: 0 | Status: SHIPPED | OUTPATIENT
Start: 2022-04-01

## 2022-04-01 RX ORDER — BLOOD-GLUCOSE METER
1 KIT MISCELLANEOUS DAILY
Qty: 1 KIT | Refills: 0 | Status: SHIPPED | OUTPATIENT
Start: 2022-04-01

## 2022-04-01 RX ORDER — INSULIN LISPRO 100 [IU]/ML
0-6 INJECTION, SOLUTION INTRAVENOUS; SUBCUTANEOUS
Qty: 3 PEN | Refills: 3 | Status: SHIPPED | OUTPATIENT
Start: 2022-04-01

## 2022-04-01 RX ORDER — INSULIN LISPRO 100 [IU]/ML
0-3 INJECTION, SOLUTION INTRAVENOUS; SUBCUTANEOUS NIGHTLY
Status: DISCONTINUED | OUTPATIENT
Start: 2022-04-01 | End: 2022-04-01 | Stop reason: HOSPADM

## 2022-04-01 RX ORDER — POTASSIUM CHLORIDE 20 MEQ/1
40 TABLET, EXTENDED RELEASE ORAL ONCE
Status: COMPLETED | OUTPATIENT
Start: 2022-04-01 | End: 2022-04-01

## 2022-04-01 RX ORDER — THIAMINE MONONITRATE (VIT B1) 100 MG
100 TABLET ORAL DAILY
Qty: 30 TABLET | Refills: 0 | Status: SHIPPED | OUTPATIENT
Start: 2022-04-02

## 2022-04-01 RX ORDER — GLUCOSAMINE HCL/CHONDROITIN SU 500-400 MG
CAPSULE ORAL
Qty: 100 STRIP | Refills: 3 | Status: SHIPPED | OUTPATIENT
Start: 2022-04-01

## 2022-04-01 RX ORDER — PEN NEEDLE, DIABETIC 31 GX5/16"
1 NEEDLE, DISPOSABLE MISCELLANEOUS
Qty: 100 EACH | Refills: 3 | Status: SHIPPED | OUTPATIENT
Start: 2022-04-01

## 2022-04-01 RX ADMIN — THIAMINE HCL TAB 100 MG 100 MG: 100 TAB at 08:38

## 2022-04-01 RX ADMIN — THERA TABS 1 TABLET: TAB at 08:38

## 2022-04-01 RX ADMIN — POTASSIUM CHLORIDE 40 MEQ: 20 TABLET, EXTENDED RELEASE ORAL at 12:11

## 2022-04-01 RX ADMIN — ENOXAPARIN SODIUM 40 MG: 100 INJECTION SUBCUTANEOUS at 08:38

## 2022-04-01 RX ADMIN — INSULIN LISPRO 1 UNITS: 100 INJECTION, SOLUTION INTRAVENOUS; SUBCUTANEOUS at 12:44

## 2022-04-01 RX ADMIN — DIBASIC SODIUM PHOSPHATE, MONOBASIC POTASSIUM PHOSPHATE AND MONOBASIC SODIUM PHOSPHATE 2 TABLET: 852; 155; 130 TABLET ORAL at 12:10

## 2022-04-01 ASSESSMENT — PAIN SCALES - GENERAL
PAINLEVEL_OUTOF10: 0
PAINLEVEL_OUTOF10: 0

## 2022-04-01 NOTE — CARE COORDINATION
Case Management Assessment            Discharge Note                    Date / Time of Note: 4/1/2022 12:01 PM                  Discharge Note Completed by: DALLAS Bundy, YANIQUEW    Patient Name: Brigida Calderon   YOB: 1980  Diagnosis: Diabetic ketoacidosis without coma associated with other specified diabetes mellitus ([facility] [E13.10]  DKA, type 2, not at goal [facility]) [E11.10]  Diabetic ketoacidosis without coma associated with diabetes mellitus due to underlying condition ([facility] [E08.10]   Date / Time: 3/30/2022  8:44 AM    Current PCP: No primary care provider on file. Clinic patient: No    Hospitalization in the last 30 days: No    Advance Directives:  Code Status: Limited  [place] DNR form completed and on chart: Yes    Financial:  Payor: /      Pharmacy:    [facility]  [address] [address]  Phone: [phone] Fax: [phone]      Assistance purchasing medications?: Potential Assistance Purchasing Medications: Yes  Assistance provided by Case Management: Voucher with  Director Approval    Does patient want to participate in local refill/ meds to beds program?:      Meds To Beds General Rules:  1. Can ONLY be done Monday- Friday between 8:30am-5pm  2. Prescription(s) must be in pharmacy by 3pm to be filled same day  3. Copy of patient's insurance/ prescription drug card and patient face sheet must be sent along with the prescription(s)  4. Cost of Rx cannot be added to hospital bill. If financial assistance is needed, please contact unit  or ;  or  CANNOT provide pharmacy voucher for patients co-pays  5.  Patients can then  the prescription on their way out of the hospital at discharge, or pharmacy can deliver to the bedside if staff is available. (payment due at time of pick-up or delivery - cash, check, or card accepted) Able to afford home medications/ co-pay costs: No    ADLS:  Current PT AM-PAC Score:   /24  Current OT AM-PAC Score:   /24      DISCHARGE Disposition: Home- No Services Needed    LOC at discharge: Not Applicable  JANELL Completed: Not Indicated    Notification completed in HENS/PAS?:  Not Applicable    IMM Completed:   Not Indicated    Transportation:  Transportation PLAN for discharge: friend   Mode of Transport: Private Car    Home Care:  Home Care ordered at discharge: No    Durable Medical Equipment:  Equipment obtained during hospitalization: NA    Home Oxygen and Respiratory Equipment:  Oxygen needed at discharge?: No    Dialysis:  Dialysis patient: No      Referrals made at [facility] for outpatient continued care:  Not Applicable    Additional CM Notes:  Pt will DC today to family's home (pt here temporarily from [place]). Pt's friend may be able to transport, otherwise CM will [facility] up [facility]. Pt has no insurance. CM called [employer] (that pt has previously used) to see if med prices would be any less expensive; prices were several hundreds of dollars. CM will provide rx voucher to help purchase pt's needed meds/supplies. No other needs at this time. The Plan for Transition of Care is related to the following treatment goals of Diabetic ketoacidosis without coma associated with other specified diabetes mellitus ([facility] [address] [E13.10]  DKA, type 2, not at goal [facility]) [E11.10]  Diabetic ketoacidosis without coma associated with diabetes mellitus due to underlying condition ([facility] [address] [E08.10]    The Patient and/or patient representative Verona Cabrales and his family were provided with a choice of provider and agrees with the discharge plan Yes    Freedom of choice list was provided with basic dialogue that supports the patient's individualized plan of care/goals and shares the quality data associated with the providers.  Yes    Care Transitions patient: No    DALLAS Crump, YANIQUEW  [facility]  Case Management Department  Ph: [phone]  Fax: [phone]

## 2022-04-01 NOTE — PROGRESS NOTES
4 Eyes Skin Assessment     NAME:  Sally Trejo  YOB: 1980  MEDICAL RECORD NUMBER:  [id number]    The patient is being assess for  Admission    I agree that 2 RN's have performed a thorough Head to Toe Skin Assessment on the patient. ALL assessment sites listed below have been assessed. Areas assessed by both nurses:    Head, Face, Ears, Shoulders, Back, Chest, Arms, Elbows, Hands, Sacrum. Buttock, Coccyx, Ischium and Legs. Feet and Heels        Does the Patient have a Wound?  No noted wound(s)       Valentin Prevention initiated:  No   Wound Care Orders initiated:  No    Pressure Injury (Stage 3,4, Unstageable, DTI, NWPT, and Complex wounds) if present place consult order under [de-identified] No    New and Established Ostomies if present place consult order under : No      Nurse 1 eSignature: Electronically signed by Fabienne Smith RN on 3/31/22 at 9:02 PM EDT    **SHARE this note so that the co-signing nurse is able to place an eSignature**    Nurse 2 eSignature: Electronically signed by Olivia Bond RN on 4/1/22 at 4:06 AM EDT

## 2022-04-01 NOTE — PROGRESS NOTES
Patient admitted to [facility] from ICU. A&Ox4. No c/o SOB, pain, or nausea. RA, sat 99%. Lungs clear. Skin intact. Up ad chino to bathroom. No needs at this time. Patient resting comfortably in bed. Call light in reach. Will continue to monitor.    Electronically signed by Elana Maravilla RN on 3/31/2022 at 9:04 PM

## 2022-04-01 NOTE — DISCHARGE SUMMARY
Hospital Medicine Discharge Summary      Patient ID: Scott Ellis      Patient's PCP: No primary care provider on file. Admit Date: 3/30/2022     Discharge Date:  4/1/2022     Admitting Physician: Marilynn Alejandra MD    Discharge Physician: Marilynn Alejandra MD     Discharge Diagnoses: Active Hospital Problems    Diagnosis Date Noted    DKA, type 2, not at goal [facility]) [E11.10] 03/30/2022    Diabetic acidosis without coma ([facility] [address] [E11.10] 03/30/2022         The patient was seen and examined on day of discharge and this discharge summary is in conjunction with any daily progress note from day of discharge. Hospital Course:     Patient is a 45 y/o male with medical history of diabetes, not on insulin at home currently, presented to [facility] ER with shortness of breath. Here visiting from [place]. He was found to be in severe DKA with pH 6.89 and AG 29. Admitted to the ICU for insulin drip and IVF. Anion gap closed and patient was transitioned to sc insulin lantus. A1C 11.8%. patient was discharged in stable condition with lantus and ISS. He was given information for [address] [place] [address] outpatient clinic to arrange follow up. Patient planned to travel back to [place] soon. Advised to establish medical care at home. Consults:     IP CONSULT TO SOCIAL WORK  IP CONSULT TO CRITICAL CARE      Disposition: Home     Discharged Condition: Stable    Code Status: Limited    Activity: activity as tolerated    Diet: diabetic diet    Follow Up: Primary Care Physician in one week    Exam:     General appearance: No apparent distress, appears stated age and cooperative. Lungs: Clear to ascultation, bilaterally without Rales/Wheezes/Rhonchi with good respiratory effort. Heart: Regular rate and rhythm with Normal S1/S2 without  murmurs, rubs or gallops, point of maximum impulse non-displaced  Abdomen: Soft, non-tender or non-distended without rigidity or guarding and positive bowel sounds all four quadrants.   Extremities: No ([employer] PEN NEEDLES 31G) 31G X 8 MM MISC 1 each by Does not apply route 4 times daily (before meals and nightly)  Qty: 100 each, Refills: 3                Time Spent on discharge is more than 30 minutes in the examination, evaluation, counseling and review of medications and discharge plan. Signed:  Tamy Cooper MD   4/1/2022      Thank you No primary care provider on file. for the opportunity to be involved in this patient's care. If you have any questions or concerns please feel free to contact me at [phone].

## 2022-04-03 LAB
BLOOD CULTURE, ROUTINE: NORMAL
CULTURE, BLOOD 2: NORMAL